# Patient Record
Sex: FEMALE | Race: BLACK OR AFRICAN AMERICAN | NOT HISPANIC OR LATINO | Employment: OTHER | URBAN - METROPOLITAN AREA
[De-identification: names, ages, dates, MRNs, and addresses within clinical notes are randomized per-mention and may not be internally consistent; named-entity substitution may affect disease eponyms.]

---

## 2023-03-10 DIAGNOSIS — F41.9 ANXIETY DISORDER, UNSPECIFIED: ICD-10-CM

## 2023-03-17 RX ORDER — BUSPIRONE HYDROCHLORIDE 15 MG/1
TABLET ORAL
Qty: 30 TABLET | Refills: 3 | Status: SHIPPED | OUTPATIENT
Start: 2023-03-17 | End: 2023-06-16

## 2023-03-29 DIAGNOSIS — I10 ESSENTIAL (PRIMARY) HYPERTENSION: ICD-10-CM

## 2023-03-29 RX ORDER — VERAPAMIL HYDROCHLORIDE 120 MG/1
TABLET, FILM COATED ORAL
Qty: 270 TABLET | Refills: 1 | Status: SHIPPED | OUTPATIENT
Start: 2023-03-29 | End: 2023-06-20 | Stop reason: ALTCHOICE

## 2023-05-09 ENCOUNTER — OFFICE VISIT (OUTPATIENT)
Dept: PRIMARY CARE | Facility: CLINIC | Age: 68
End: 2023-05-09
Payer: MEDICARE

## 2023-05-09 VITALS — SYSTOLIC BLOOD PRESSURE: 130 MMHG | HEART RATE: 70 BPM | DIASTOLIC BLOOD PRESSURE: 94 MMHG

## 2023-05-09 DIAGNOSIS — J01.00 ACUTE NON-RECURRENT MAXILLARY SINUSITIS: Primary | ICD-10-CM

## 2023-05-09 PROCEDURE — 99213 OFFICE O/P EST LOW 20 MIN: CPT | Performed by: INTERNAL MEDICINE

## 2023-05-09 PROCEDURE — 3008F BODY MASS INDEX DOCD: CPT | Performed by: INTERNAL MEDICINE

## 2023-05-09 RX ORDER — BUTALBITAL, ACETAMINOPHEN AND CAFFEINE 50; 325; 40 MG/1; MG/1; MG/1
1 TABLET ORAL EVERY 6 HOURS PRN
COMMUNITY
Start: 2021-07-21 | End: 2023-12-03

## 2023-05-09 RX ORDER — BENZONATATE 200 MG/1
1 CAPSULE ORAL 3 TIMES DAILY PRN
COMMUNITY
Start: 2021-12-30

## 2023-05-09 RX ORDER — ALLOPURINOL 100 MG/1
100 TABLET ORAL DAILY
COMMUNITY
Start: 2020-12-03 | End: 2023-07-19

## 2023-05-09 RX ORDER — ALBUTEROL SULFATE 0.83 MG/ML
2.5 SOLUTION RESPIRATORY (INHALATION) EVERY 6 HOURS PRN
COMMUNITY
Start: 2014-12-08

## 2023-05-09 RX ORDER — FLUTICASONE PROPIONATE 50 MCG
1 SPRAY, SUSPENSION (ML) NASAL 2 TIMES DAILY
COMMUNITY
Start: 2013-09-19

## 2023-05-09 RX ORDER — FUROSEMIDE 40 MG/1
40 TABLET ORAL DAILY
COMMUNITY
End: 2023-10-10 | Stop reason: ALTCHOICE

## 2023-05-09 RX ORDER — ACETAMINOPHEN 500 MG
1 TABLET ORAL DAILY
COMMUNITY
Start: 2020-12-03

## 2023-05-09 RX ORDER — CLONIDINE HYDROCHLORIDE 0.1 MG/1
1 TABLET ORAL 3 TIMES DAILY
COMMUNITY
Start: 2017-05-24 | End: 2023-10-10

## 2023-05-09 RX ORDER — ALBUTEROL SULFATE 90 UG/1
2 AEROSOL, METERED RESPIRATORY (INHALATION) EVERY 6 HOURS PRN
COMMUNITY
Start: 2011-10-05

## 2023-05-09 RX ORDER — BUMETANIDE 0.5 MG/1
0.5 TABLET ORAL DAILY
COMMUNITY
Start: 2023-01-20 | End: 2023-10-10 | Stop reason: SDUPTHER

## 2023-05-09 RX ORDER — AZELASTINE 1 MG/ML
2 SPRAY, METERED NASAL 2 TIMES DAILY
COMMUNITY
Start: 2016-02-04

## 2023-05-09 RX ORDER — DOXYCYCLINE 100 MG/1
100 CAPSULE ORAL 2 TIMES DAILY
Qty: 28 CAPSULE | Refills: 0 | Status: SHIPPED | OUTPATIENT
Start: 2023-05-09 | End: 2023-05-23

## 2023-05-09 RX ORDER — HYDRALAZINE HYDROCHLORIDE 25 MG/1
25 TABLET, FILM COATED ORAL 3 TIMES DAILY
COMMUNITY
Start: 2022-01-22 | End: 2023-10-10 | Stop reason: SDUPTHER

## 2023-05-09 NOTE — PROGRESS NOTES
Subjective   Patient ID: Dilma John is a 68 y.o. female who presents with right ear pain    HPI   The patient reports a history of constant burning pain in the right ear x3 days.  She does report an increase in the intensity of pain when she lies on her left side.  The patient reports a history of a cough, nasal congestion, rhinorrhea, postnasal drip, sneezing since the beginning of the spring.  She reports that the cough initially was productive of clear sputum but over the past 2 weeks has been productive of dark sputum.  She does report yellow-green nasal discharge over the past 2 weeks as well.  Recently, she reports a history of waking up with the eyelids of both eyes being stuck together and hard yellow eye discharge in both eyelids.    She reports no other associated symptoms.  Review of Systems    Objective   There were no vitals taken for this visit.    Physical Exam  Head-palpation revealed tenderness over the right maxillary sinus but no tenderness over the left maxillary sinus or frontal sinuses bilaterally.  Ears-palpation of each pinna and each tragus revealed no tenderness.  External auditory canals are narrow, not erythematous or swollen, TMs clear  Nose-turbinates not erythematous or swollen, no septal deviation noted..  Mouth-posterior pharynx not erythematous, tonsillar pillars appeared normal, no exudates  Neck-no lymphadenopathy.  Lungs-clear.  Cardiac-rate normal, rhythm regular, no murmurs, no JVD.  Abdomen-soft, nondistended. Normal active bowel sounds. Palpation revealed moderate tenderness in the epigastrium but no rebound tenderness or masses.  Liver percussed to 9 cm in total span.  Extremities-no peripheral edema  Assessment/Plan        Assessment  Cough, nasal congestion, rhinorrhea, postnasal drip, sneezing-probably secondary to acute sinusitis in the setting of allergic rhinitis.  Viral syndrome in the setting of allergic rhinitis is a possible etiology as well.  Constant burning  pain in the right ear-may be secondary to eustachian tube dysfunction secondary to acute sinusitis or viral syndrome in the setting of allergic rhinitis.  Morning episodes of the eyelids being stuck together with yellow discharge in both eyelids-may be secondary to allergic conjunctivitis, viral syndrome.  Plan  Begin doxycycline 100 mg p.o. twice daily x10 days.  Continue Astelin spray, Flonase and begin either Zyrtec or Xyzal 1 tablet at bedtime  Continue all other current medications for now  Patient should call me in 5 days with her condition

## 2023-06-13 DIAGNOSIS — F41.9 ANXIETY DISORDER, UNSPECIFIED: ICD-10-CM

## 2023-06-16 RX ORDER — BUSPIRONE HYDROCHLORIDE 15 MG/1
TABLET ORAL
Qty: 90 TABLET | Refills: 1 | Status: SHIPPED | OUTPATIENT
Start: 2023-06-16

## 2023-06-20 DIAGNOSIS — R42 DIZZINESS AND GIDDINESS: ICD-10-CM

## 2023-06-20 RX ORDER — VERAPAMIL HYDROCHLORIDE 120 MG/1
1 TABLET, FILM COATED ORAL 3 TIMES DAILY
COMMUNITY
Start: 2016-06-18 | End: 2023-10-10 | Stop reason: SDUPTHER

## 2023-06-20 RX ORDER — ISOSORBIDE MONONITRATE 60 MG/1
60 TABLET, EXTENDED RELEASE ORAL DAILY
COMMUNITY
Start: 2020-09-28 | End: 2023-10-10 | Stop reason: SDUPTHER

## 2023-06-20 RX ORDER — ASPIRIN 81 MG/1
81 TABLET ORAL DAILY
COMMUNITY
End: 2024-02-09 | Stop reason: WASHOUT

## 2023-06-20 RX ORDER — GABAPENTIN 600 MG/1
2 TABLET ORAL 3 TIMES DAILY
COMMUNITY
Start: 2019-06-04

## 2023-06-20 RX ORDER — DULOXETIN HYDROCHLORIDE 60 MG/1
2 CAPSULE, DELAYED RELEASE ORAL DAILY
COMMUNITY
Start: 2012-07-02 | End: 2023-06-21 | Stop reason: ALTCHOICE

## 2023-06-20 RX ORDER — PANTOPRAZOLE SODIUM 20 MG/1
20 TABLET, DELAYED RELEASE ORAL DAILY
COMMUNITY
End: 2023-11-14

## 2023-06-20 RX ORDER — ONDANSETRON 4 MG/1
8 TABLET, FILM COATED ORAL EVERY 8 HOURS PRN
COMMUNITY
End: 2024-02-09 | Stop reason: SDUPTHER

## 2023-06-20 RX ORDER — HYDROXYZINE PAMOATE 25 MG/1
1 CAPSULE ORAL 3 TIMES DAILY PRN
COMMUNITY
Start: 2013-09-15 | End: 2023-06-21 | Stop reason: SDUPTHER

## 2023-06-20 RX ORDER — TIZANIDINE 4 MG/1
4 TABLET ORAL NIGHTLY
COMMUNITY
End: 2024-02-21 | Stop reason: SDUPTHER

## 2023-06-20 RX ORDER — PREDNISONE 10 MG/1
TABLET ORAL
COMMUNITY
Start: 2023-05-26 | End: 2023-06-21 | Stop reason: SDUPTHER

## 2023-06-20 RX ORDER — FAMOTIDINE 20 MG/1
20 TABLET, FILM COATED ORAL NIGHTLY
COMMUNITY
Start: 2021-11-02 | End: 2023-11-07 | Stop reason: ALTCHOICE

## 2023-06-20 RX ORDER — BUSPIRONE HYDROCHLORIDE 15 MG/1
0.5 TABLET ORAL 2 TIMES DAILY
COMMUNITY
Start: 2019-08-06 | End: 2023-11-07 | Stop reason: SDUPTHER

## 2023-06-20 RX ORDER — MECLIZINE HCL 12.5 MG 12.5 MG/1
TABLET ORAL
Qty: 21 TABLET | Refills: 1 | Status: SHIPPED | OUTPATIENT
Start: 2023-06-20 | End: 2024-02-21 | Stop reason: SDUPTHER

## 2023-06-20 RX ORDER — MONTELUKAST SODIUM 10 MG/1
10 TABLET ORAL NIGHTLY
COMMUNITY
Start: 2022-10-31 | End: 2024-02-09 | Stop reason: WASHOUT

## 2023-06-21 ENCOUNTER — OFFICE VISIT (OUTPATIENT)
Dept: PRIMARY CARE | Facility: CLINIC | Age: 68
End: 2023-06-21
Payer: MEDICARE

## 2023-06-21 VITALS — BODY MASS INDEX: 32.79 KG/M2 | WEIGHT: 191 LBS

## 2023-06-21 DIAGNOSIS — F41.9 ANXIETY: ICD-10-CM

## 2023-06-21 DIAGNOSIS — J01.00 ACUTE NON-RECURRENT MAXILLARY SINUSITIS: Primary | ICD-10-CM

## 2023-06-21 DIAGNOSIS — G43.901 STATUS MIGRAINOSUS: ICD-10-CM

## 2023-06-21 DIAGNOSIS — R42 VERTIGO: Primary | ICD-10-CM

## 2023-06-21 PROCEDURE — 3008F BODY MASS INDEX DOCD: CPT | Performed by: INTERNAL MEDICINE

## 2023-06-21 PROCEDURE — 99213 OFFICE O/P EST LOW 20 MIN: CPT | Performed by: INTERNAL MEDICINE

## 2023-06-21 RX ORDER — VENLAFAXINE 37.5 MG/1
37.5 TABLET ORAL DAILY
Qty: 90 TABLET | Refills: 2 | Status: SHIPPED | OUTPATIENT
Start: 2023-06-21 | End: 2024-02-21 | Stop reason: WASHOUT

## 2023-06-21 RX ORDER — MOXIFLOXACIN HYDROCHLORIDE 400 MG/1
400 TABLET ORAL DAILY
Qty: 10 TABLET | Refills: 0 | Status: SHIPPED | OUTPATIENT
Start: 2023-06-21 | End: 2023-11-07 | Stop reason: ALTCHOICE

## 2023-06-21 RX ORDER — HYDROXYZINE PAMOATE 25 MG/1
25 CAPSULE ORAL 3 TIMES DAILY PRN
Qty: 30 CAPSULE | Refills: 1 | Status: SHIPPED | OUTPATIENT
Start: 2023-06-21 | End: 2023-06-21 | Stop reason: SDUPTHER

## 2023-06-21 RX ORDER — PREDNISONE 10 MG/1
TABLET ORAL
Status: CANCELLED | OUTPATIENT
Start: 2023-06-21

## 2023-06-21 RX ORDER — PREDNISONE 10 MG/1
TABLET ORAL
Qty: 20 EACH | Refills: 1 | Status: SHIPPED | OUTPATIENT
Start: 2023-06-21 | End: 2023-10-17 | Stop reason: SDUPTHER

## 2023-06-21 RX ORDER — MOXIFLOXACIN HYDROCHLORIDE 400 MG/1
400 TABLET ORAL DAILY
COMMUNITY
End: 2023-06-21 | Stop reason: SDUPTHER

## 2023-06-21 RX ORDER — VENLAFAXINE 37.5 MG/1
37.5 TABLET ORAL DAILY
COMMUNITY
Start: 2022-11-08 | End: 2023-06-21 | Stop reason: SDUPTHER

## 2023-06-21 RX ORDER — PREDNISONE 10 MG/1
10 TABLET ORAL DAILY
Qty: 21 EACH | Refills: 0 | Status: CANCELLED | OUTPATIENT
Start: 2023-06-21 | End: 2023-07-12

## 2023-06-21 RX ORDER — HYDROXYZINE PAMOATE 25 MG/1
25 CAPSULE ORAL 3 TIMES DAILY PRN
Qty: 30 CAPSULE | Refills: 1 | Status: SHIPPED | OUTPATIENT
Start: 2023-06-21 | End: 2023-12-21

## 2023-07-19 DIAGNOSIS — Z00.00 ENCOUNTER FOR GENERAL ADULT MEDICAL EXAMINATION WITHOUT ABNORMAL FINDINGS: ICD-10-CM

## 2023-07-19 RX ORDER — ALLOPURINOL 100 MG/1
TABLET ORAL
Qty: 90 TABLET | Refills: 2 | Status: SHIPPED | OUTPATIENT
Start: 2023-07-19

## 2023-10-05 DIAGNOSIS — I10 PRIMARY HYPERTENSION: Primary | ICD-10-CM

## 2023-10-05 RX ORDER — MONTELUKAST SODIUM 10 MG/1
10 TABLET ORAL DAILY
Qty: 90 TABLET | Refills: 1 | OUTPATIENT
Start: 2023-10-05

## 2023-10-05 NOTE — TELEPHONE ENCOUNTER
Patient last seen 5/1/23. Last refill 5/1/23 #90 with 3 refills. Patient switched pharmacy. Please sign and send to new pharmacy. Thanks

## 2023-10-10 PROBLEM — K21.00 GASTROESOPHAGEAL REFLUX DISEASE WITH ESOPHAGITIS: Status: ACTIVE | Noted: 2023-10-10

## 2023-10-10 PROBLEM — T63.441A BEE STING REACTION: Status: ACTIVE | Noted: 2023-10-10

## 2023-10-10 PROBLEM — R06.09 DOE (DYSPNEA ON EXERTION): Status: ACTIVE | Noted: 2023-10-10

## 2023-10-10 PROBLEM — R04.0 EPISTAXIS, RECURRENT: Status: ACTIVE | Noted: 2023-10-10

## 2023-10-10 PROBLEM — Z85.21 HISTORY OF MALIGNANT NEOPLASM OF LARYNX: Status: ACTIVE | Noted: 2022-10-06

## 2023-10-10 PROBLEM — G43.709 CHRONIC MIGRAINE WITHOUT AURA WITHOUT STATUS MIGRAINOSUS, NOT INTRACTABLE: Status: ACTIVE | Noted: 2023-10-10

## 2023-10-10 PROBLEM — R07.9 CHEST PAIN: Status: ACTIVE | Noted: 2023-10-10

## 2023-10-10 PROBLEM — J37.0 CHRONIC LARYNGITIS: Status: ACTIVE | Noted: 2022-10-06

## 2023-10-10 PROBLEM — J30.9 ALLERGIC RHINITIS: Status: ACTIVE | Noted: 2022-10-06

## 2023-10-10 PROBLEM — H52.203 ASTIGMATISM, BILATERAL: Status: ACTIVE | Noted: 2023-10-10

## 2023-10-10 PROBLEM — M62.559 ATROPHY OF QUADRICEPS FEMORIS MUSCLE: Status: ACTIVE | Noted: 2023-10-10

## 2023-10-10 PROBLEM — F41.9 ANXIETY DISORDER: Status: ACTIVE | Noted: 2023-10-10

## 2023-10-10 PROBLEM — J45.909 ASTHMA (HHS-HCC): Status: ACTIVE | Noted: 2023-10-10

## 2023-10-10 PROBLEM — R49.0 CHRONIC HOARSENESS: Status: ACTIVE | Noted: 2022-10-06

## 2023-10-10 PROBLEM — I50.20 HEART FAILURE WITH REDUCED EJECTION FRACTION (MULTI): Status: ACTIVE | Noted: 2023-10-10

## 2023-10-10 PROBLEM — J01.11 ACUTE RECURRENT FRONTAL SINUSITIS: Status: ACTIVE | Noted: 2023-10-10

## 2023-10-10 PROBLEM — I21.4: Status: ACTIVE | Noted: 2023-10-10

## 2023-10-10 PROBLEM — M25.562 BILATERAL KNEE PAIN: Status: ACTIVE | Noted: 2023-10-10

## 2023-10-10 PROBLEM — E78.5 DYSLIPIDEMIA: Status: ACTIVE | Noted: 2023-10-10

## 2023-10-10 PROBLEM — M25.561 BILATERAL KNEE PAIN: Status: ACTIVE | Noted: 2023-10-10

## 2023-10-10 PROBLEM — G47.33 OBSTRUCTIVE SLEEP APNEA: Status: ACTIVE | Noted: 2023-10-10

## 2023-10-10 PROBLEM — N18.30 STAGE 3 CHRONIC KIDNEY DISEASE (MULTI): Status: ACTIVE | Noted: 2023-10-10

## 2023-10-10 PROBLEM — I10 HYPERTENSION: Status: ACTIVE | Noted: 2023-10-10

## 2023-10-10 PROBLEM — H66.91 CHRONIC OTITIS MEDIA OF RIGHT EAR: Status: ACTIVE | Noted: 2023-10-10

## 2023-10-10 RX ORDER — BUMETANIDE 0.5 MG/1
0.5 TABLET ORAL DAILY
Qty: 90 TABLET | Refills: 3 | Status: SHIPPED | OUTPATIENT
Start: 2023-10-10 | End: 2024-02-09 | Stop reason: SDUPTHER

## 2023-10-10 RX ORDER — CLONIDINE HYDROCHLORIDE 0.1 MG/1
0.1 TABLET ORAL 3 TIMES DAILY
Qty: 270 TABLET | Refills: 3 | Status: SHIPPED | OUTPATIENT
Start: 2023-10-10 | End: 2024-02-09 | Stop reason: SDUPTHER

## 2023-10-10 RX ORDER — ISOSORBIDE MONONITRATE 60 MG/1
60 TABLET, EXTENDED RELEASE ORAL DAILY
Qty: 90 TABLET | Refills: 3 | Status: SHIPPED | OUTPATIENT
Start: 2023-10-10 | End: 2024-02-09 | Stop reason: SDUPTHER

## 2023-10-10 RX ORDER — VERAPAMIL HYDROCHLORIDE 120 MG/1
120 TABLET, FILM COATED ORAL 3 TIMES DAILY
Qty: 270 TABLET | Refills: 3 | Status: SHIPPED | OUTPATIENT
Start: 2023-10-10 | End: 2024-02-09 | Stop reason: SDUPTHER

## 2023-10-10 RX ORDER — HYDRALAZINE HYDROCHLORIDE 25 MG/1
25 TABLET, FILM COATED ORAL 3 TIMES DAILY
Qty: 270 TABLET | Refills: 3 | Status: SHIPPED | OUTPATIENT
Start: 2023-10-10 | End: 2024-02-09 | Stop reason: SDUPTHER

## 2023-10-17 DIAGNOSIS — G43.901 STATUS MIGRAINOSUS: ICD-10-CM

## 2023-10-18 RX ORDER — PREDNISONE 10 MG/1
TABLET ORAL
Qty: 15 EACH | Refills: 1 | Status: SHIPPED | OUTPATIENT
Start: 2023-10-18 | End: 2023-11-07 | Stop reason: SDUPTHER

## 2023-11-06 PROBLEM — M60.9 MYOSITIS: Status: ACTIVE | Noted: 2023-11-06

## 2023-11-06 PROBLEM — M62.830 SPASM OF BACK MUSCLES: Status: ACTIVE | Noted: 2023-11-06

## 2023-11-06 PROBLEM — M54.50 LOW BACK PAIN: Status: ACTIVE | Noted: 2023-11-06

## 2023-11-06 PROBLEM — H92.09 EARACHE: Status: ACTIVE | Noted: 2023-11-06

## 2023-11-06 PROBLEM — Z47.1 AFTERCARE FOLLOWING BILATERAL KNEE JOINT REPLACEMENT SURGERY: Status: ACTIVE | Noted: 2023-11-06

## 2023-11-06 PROBLEM — R68.84 JAW PAIN, NON-TMJ: Status: ACTIVE | Noted: 2023-11-06

## 2023-11-06 PROBLEM — R80.9 PROTEINURIA: Status: ACTIVE | Noted: 2023-11-06

## 2023-11-06 PROBLEM — M25.469 EDEMA OF KNEE: Status: ACTIVE | Noted: 2023-11-06

## 2023-11-06 PROBLEM — M19.90 OSTEOARTHRITIS: Status: ACTIVE | Noted: 2023-11-06

## 2023-11-06 PROBLEM — M54.16 LUMBAR RADICULOPATHY, ACUTE: Status: ACTIVE | Noted: 2023-11-06

## 2023-11-06 PROBLEM — M53.3 SACROILIAC JOINT PAIN: Status: ACTIVE | Noted: 2023-11-06

## 2023-11-06 PROBLEM — M25.662 STIFFNESS OF LEFT KNEE, NOT ELSEWHERE CLASSIFIED: Status: ACTIVE | Noted: 2023-11-06

## 2023-11-06 PROBLEM — R11.0 NAUSEA IN ADULT: Status: ACTIVE | Noted: 2023-11-06

## 2023-11-06 PROBLEM — R63.4 WEIGHT LOSS, UNINTENTIONAL: Status: ACTIVE | Noted: 2023-11-06

## 2023-11-06 PROBLEM — M48.061 FORAMINAL STENOSIS OF LUMBAR REGION: Status: ACTIVE | Noted: 2023-11-06

## 2023-11-06 PROBLEM — Z88.9 HISTORY OF ALLERGY: Status: ACTIVE | Noted: 2023-11-06

## 2023-11-06 PROBLEM — E66.9 CLASS 1 OBESITY WITH BODY MASS INDEX (BMI) OF 30.0 TO 30.9 IN ADULT: Status: ACTIVE | Noted: 2023-11-06

## 2023-11-06 PROBLEM — M26.629 TEMPOROMANDIBULAR JOINT-PAIN-DYSFUNCTION SYNDROME: Status: ACTIVE | Noted: 2022-10-06

## 2023-11-06 PROBLEM — R26.2 DIFFICULTY WALKING DUE TO KNEE JOINT: Status: ACTIVE | Noted: 2023-11-06

## 2023-11-06 PROBLEM — M79.606 LEG PAIN, LATERAL: Status: ACTIVE | Noted: 2023-11-06

## 2023-11-06 PROBLEM — R31.9 HEMATURIA: Status: ACTIVE | Noted: 2023-11-06

## 2023-11-06 PROBLEM — H52.13 BILATERAL MYOPIA: Status: ACTIVE | Noted: 2023-11-06

## 2023-11-06 PROBLEM — M62.81 QUADRICEPS WEAKNESS: Status: ACTIVE | Noted: 2023-11-06

## 2023-11-06 PROBLEM — Z96.653 AFTERCARE FOLLOWING BILATERAL KNEE JOINT REPLACEMENT SURGERY: Status: ACTIVE | Noted: 2023-11-06

## 2023-11-06 PROBLEM — E66.9 CLASS 1 OBESITY WITH BODY MASS INDEX (BMI) OF 34.0 TO 34.9 IN ADULT: Status: ACTIVE | Noted: 2023-11-06

## 2023-11-06 PROBLEM — M54.16 LUMBAR NEURITIS: Status: ACTIVE | Noted: 2023-11-06

## 2023-11-06 PROBLEM — L72.3 SEBACEOUS CYST: Status: ACTIVE | Noted: 2023-11-06

## 2023-11-06 PROBLEM — R10.9 ABDOMINAL PAIN: Status: ACTIVE | Noted: 2023-11-06

## 2023-11-06 PROBLEM — R82.81 PYURIA: Status: ACTIVE | Noted: 2023-11-06

## 2023-11-06 PROBLEM — M54.81 BILATERAL OCCIPITAL NEURALGIA: Status: ACTIVE | Noted: 2023-11-06

## 2023-11-06 PROBLEM — R13.10 ODYNOPHAGIA: Status: ACTIVE | Noted: 2023-11-06

## 2023-11-06 PROBLEM — H92.01 REFERRED OTALGIA OF RIGHT EAR: Status: ACTIVE | Noted: 2022-10-06

## 2023-11-06 PROBLEM — M53.0 CERVICOCRANIAL SYNDROME: Status: ACTIVE | Noted: 2023-11-06

## 2023-11-06 PROBLEM — M25.462 EFFUSION OF LEFT KNEE: Status: ACTIVE | Noted: 2023-11-06

## 2023-11-06 PROBLEM — R79.89 ELEVATED SERUM CREATININE: Status: ACTIVE | Noted: 2023-11-06

## 2023-11-06 PROBLEM — R19.7 DIARRHEA: Status: ACTIVE | Noted: 2023-11-06

## 2023-11-06 PROBLEM — K11.7 SALIVARY SECRETION DISTURBANCE: Status: ACTIVE | Noted: 2023-11-06

## 2023-11-06 PROBLEM — K29.70 GASTRITIS: Status: ACTIVE | Noted: 2023-11-06

## 2023-11-06 PROBLEM — B37.2 CANDIDAL INTERTRIGO: Status: ACTIVE | Noted: 2023-11-06

## 2023-11-06 PROBLEM — H60.90 OTITIS EXTERNA: Status: ACTIVE | Noted: 2023-11-06

## 2023-11-06 PROBLEM — R09.82 POST-NASAL DRIP: Status: ACTIVE | Noted: 2023-11-06

## 2023-11-06 PROBLEM — M79.644 PAIN OF RIGHT THUMB: Status: ACTIVE | Noted: 2023-11-06

## 2023-11-06 RX ORDER — ERYTHROMYCIN 500 MG/1
1000 TABLET, COATED ORAL
COMMUNITY

## 2023-11-06 RX ORDER — MINERAL OIL
1 ENEMA (ML) RECTAL DAILY
COMMUNITY
Start: 2022-10-06 | End: 2023-11-07 | Stop reason: ALTCHOICE

## 2023-11-06 RX ORDER — DEXAMETHASONE 2 MG/1
TABLET ORAL
COMMUNITY
End: 2023-11-07 | Stop reason: ALTCHOICE

## 2023-11-06 RX ORDER — OXYCODONE AND ACETAMINOPHEN 5; 325 MG/1; MG/1
1-2 TABLET ORAL EVERY 6 HOURS PRN
COMMUNITY
Start: 2015-09-03 | End: 2023-11-07 | Stop reason: ALTCHOICE

## 2023-11-06 RX ORDER — NEOMYCIN SULFATE, POLYMYXIN B SULFATE, HYDROCORTISONE 3.5; 10000; 1 MG/ML; [USP'U]/ML; MG/ML
3 SOLUTION/ DROPS AURICULAR (OTIC)
COMMUNITY
Start: 2022-08-09

## 2023-11-06 RX ORDER — CYCLOBENZAPRINE HCL 10 MG
10 TABLET ORAL 3 TIMES DAILY PRN
COMMUNITY
End: 2024-02-09 | Stop reason: ALTCHOICE

## 2023-11-06 RX ORDER — ACETAMINOPHEN AND CODEINE PHOSPHATE 300; 30 MG/1; MG/1
1 TABLET ORAL AS NEEDED
COMMUNITY
End: 2024-02-09 | Stop reason: ALTCHOICE

## 2023-11-06 RX ORDER — PREDNISONE 20 MG/1
60 TABLET ORAL
COMMUNITY
Start: 2022-11-08 | End: 2023-11-07 | Stop reason: ALTCHOICE

## 2023-11-06 RX ORDER — HYDROCODONE BITARTRATE AND ACETAMINOPHEN 10; 300 MG/1; MG/1
TABLET ORAL
COMMUNITY
End: 2023-11-07 | Stop reason: ALTCHOICE

## 2023-11-06 RX ORDER — DOXYCYCLINE 100 MG/1
1 CAPSULE ORAL EVERY 12 HOURS
COMMUNITY
End: 2023-11-07 | Stop reason: ALTCHOICE

## 2023-11-06 RX ORDER — METOCLOPRAMIDE 10 MG/1
10 TABLET ORAL 2 TIMES DAILY
COMMUNITY
Start: 2021-09-07

## 2023-11-06 RX ORDER — DOCUSATE SODIUM 100 MG/1
100 CAPSULE, LIQUID FILLED ORAL 2 TIMES DAILY PRN
COMMUNITY
Start: 2023-02-17 | End: 2023-11-07 | Stop reason: ALTCHOICE

## 2023-11-06 RX ORDER — HYDROCODONE BITARTRATE AND ACETAMINOPHEN 5; 325 MG/1; MG/1
1-2 TABLET ORAL AS NEEDED
COMMUNITY
Start: 2023-02-03 | End: 2023-11-07 | Stop reason: ALTCHOICE

## 2023-11-06 RX ORDER — DULOXETIN HYDROCHLORIDE 60 MG/1
2 CAPSULE, DELAYED RELEASE ORAL NIGHTLY
COMMUNITY
End: 2024-02-21 | Stop reason: SDUPTHER

## 2023-11-06 RX ORDER — TRAMADOL HYDROCHLORIDE 50 MG/1
50 TABLET ORAL EVERY 6 HOURS PRN
COMMUNITY
End: 2023-11-07 | Stop reason: ALTCHOICE

## 2023-11-06 RX ORDER — NITROGLYCERIN 0.3 MG/1
0.3 TABLET SUBLINGUAL EVERY 5 MIN PRN
COMMUNITY
Start: 2020-08-27

## 2023-11-06 RX ORDER — OMEPRAZOLE 20 MG/1
20 TABLET, DELAYED RELEASE ORAL DAILY
COMMUNITY
Start: 2023-02-17 | End: 2024-02-01 | Stop reason: WASHOUT

## 2023-11-06 RX ORDER — CHLORHEXIDINE GLUCONATE ORAL RINSE 1.2 MG/ML
15 SOLUTION DENTAL DAILY
COMMUNITY
Start: 2023-01-17 | End: 2023-11-07 | Stop reason: ALTCHOICE

## 2023-11-06 RX ORDER — FLUTICASONE PROPIONATE 110 UG/1
2 AEROSOL, METERED RESPIRATORY (INHALATION)
COMMUNITY
End: 2024-02-21

## 2023-11-06 RX ORDER — ONDANSETRON 8 MG/1
1 TABLET, ORALLY DISINTEGRATING ORAL EVERY 6 HOURS PRN
COMMUNITY
Start: 2015-12-09 | End: 2023-11-07 | Stop reason: SDUPTHER

## 2023-11-06 RX ORDER — MULTIVITAMIN WITH IRON
1 TABLET ORAL DAILY
COMMUNITY
Start: 2014-08-13

## 2023-11-06 RX ORDER — ISOSORBIDE MONONITRATE 30 MG/1
30 TABLET, EXTENDED RELEASE ORAL 2 TIMES DAILY
COMMUNITY
End: 2023-11-07 | Stop reason: WASHOUT

## 2023-11-06 RX ORDER — DICLOFENAC SODIUM 16.05 MG/ML
4 SOLUTION TOPICAL 4 TIMES DAILY PRN
COMMUNITY

## 2023-11-06 RX ORDER — ONDANSETRON 4 MG/1
1 TABLET, FILM COATED ORAL EVERY 8 HOURS PRN
COMMUNITY
Start: 2023-02-17

## 2023-11-07 ENCOUNTER — LAB (OUTPATIENT)
Dept: LAB | Facility: LAB | Age: 68
End: 2023-11-07
Payer: MEDICARE

## 2023-11-07 ENCOUNTER — TELEPHONE (OUTPATIENT)
Dept: NEUROLOGY | Facility: CLINIC | Age: 68
End: 2023-11-07

## 2023-11-07 ENCOUNTER — OFFICE VISIT (OUTPATIENT)
Dept: CARDIOLOGY | Facility: CLINIC | Age: 68
End: 2023-11-07
Payer: MEDICARE

## 2023-11-07 VITALS
BODY MASS INDEX: 34.31 KG/M2 | DIASTOLIC BLOOD PRESSURE: 82 MMHG | HEIGHT: 64 IN | SYSTOLIC BLOOD PRESSURE: 115 MMHG | WEIGHT: 201 LBS | OXYGEN SATURATION: 96 % | HEART RATE: 67 BPM

## 2023-11-07 DIAGNOSIS — G43.901 STATUS MIGRAINOSUS: Primary | ICD-10-CM

## 2023-11-07 DIAGNOSIS — I50.20 HEART FAILURE WITH REDUCED EJECTION FRACTION (MULTI): ICD-10-CM

## 2023-11-07 DIAGNOSIS — U07.1 COVID-19: Primary | ICD-10-CM

## 2023-11-07 DIAGNOSIS — E78.5 DYSLIPIDEMIA: ICD-10-CM

## 2023-11-07 DIAGNOSIS — R06.09 DOE (DYSPNEA ON EXERTION): ICD-10-CM

## 2023-11-07 DIAGNOSIS — U07.1 COVID-19: ICD-10-CM

## 2023-11-07 DIAGNOSIS — I15.0 RENOVASCULAR HYPERTENSION: ICD-10-CM

## 2023-11-07 DIAGNOSIS — I21.4 NON-ST ELEVATION MYOCARDIAL INFARCTION (NSTEMI), INITIAL CARE EPISODE (MULTI): ICD-10-CM

## 2023-11-07 LAB
BASOPHILS # BLD AUTO: 0.05 X10*3/UL (ref 0–0.1)
BASOPHILS NFR BLD AUTO: 1.3 %
EOSINOPHIL # BLD AUTO: 0.15 X10*3/UL (ref 0–0.7)
EOSINOPHIL NFR BLD AUTO: 3.9 %
ERYTHROCYTE [DISTWIDTH] IN BLOOD BY AUTOMATED COUNT: 15 % (ref 11.5–14.5)
ERYTHROCYTE [SEDIMENTATION RATE] IN BLOOD BY WESTERGREN METHOD: 14 MM/H (ref 0–30)
HCT VFR BLD AUTO: 38.6 % (ref 36–46)
HGB BLD-MCNC: 11.4 G/DL (ref 12–16)
IMM GRANULOCYTES # BLD AUTO: 0.01 X10*3/UL (ref 0–0.7)
IMM GRANULOCYTES NFR BLD AUTO: 0.3 % (ref 0–0.9)
LYMPHOCYTES # BLD AUTO: 1.22 X10*3/UL (ref 1.2–4.8)
LYMPHOCYTES NFR BLD AUTO: 31.4 %
MCH RBC QN AUTO: 24.9 PG (ref 26–34)
MCHC RBC AUTO-ENTMCNC: 29.5 G/DL (ref 32–36)
MCV RBC AUTO: 84 FL (ref 80–100)
MONOCYTES # BLD AUTO: 0.4 X10*3/UL (ref 0.1–1)
MONOCYTES NFR BLD AUTO: 10.3 %
NEUTROPHILS # BLD AUTO: 2.05 X10*3/UL (ref 1.2–7.7)
NEUTROPHILS NFR BLD AUTO: 52.8 %
NRBC BLD-RTO: 0 /100 WBCS (ref 0–0)
PLATELET # BLD AUTO: 199 X10*3/UL (ref 150–450)
RBC # BLD AUTO: 4.58 X10*6/UL (ref 4–5.2)
WBC # BLD AUTO: 3.9 X10*3/UL (ref 4.4–11.3)

## 2023-11-07 PROCEDURE — 80069 RENAL FUNCTION PANEL: CPT

## 2023-11-07 PROCEDURE — 93005 ELECTROCARDIOGRAM TRACING: CPT | Performed by: INTERNAL MEDICINE

## 2023-11-07 PROCEDURE — 3008F BODY MASS INDEX DOCD: CPT | Performed by: INTERNAL MEDICINE

## 2023-11-07 PROCEDURE — 85652 RBC SED RATE AUTOMATED: CPT

## 2023-11-07 PROCEDURE — 3074F SYST BP LT 130 MM HG: CPT | Performed by: INTERNAL MEDICINE

## 2023-11-07 PROCEDURE — 99214 OFFICE O/P EST MOD 30 MIN: CPT | Mod: 25 | Performed by: INTERNAL MEDICINE

## 2023-11-07 PROCEDURE — 80061 LIPID PANEL: CPT

## 2023-11-07 PROCEDURE — 1126F AMNT PAIN NOTED NONE PRSNT: CPT | Performed by: INTERNAL MEDICINE

## 2023-11-07 PROCEDURE — 1160F RVW MEDS BY RX/DR IN RCRD: CPT | Performed by: INTERNAL MEDICINE

## 2023-11-07 PROCEDURE — 86140 C-REACTIVE PROTEIN: CPT

## 2023-11-07 PROCEDURE — 99214 OFFICE O/P EST MOD 30 MIN: CPT | Performed by: INTERNAL MEDICINE

## 2023-11-07 PROCEDURE — 3079F DIAST BP 80-89 MM HG: CPT | Performed by: INTERNAL MEDICINE

## 2023-11-07 PROCEDURE — 93010 ELECTROCARDIOGRAM REPORT: CPT | Performed by: INTERNAL MEDICINE

## 2023-11-07 PROCEDURE — 1036F TOBACCO NON-USER: CPT | Performed by: INTERNAL MEDICINE

## 2023-11-07 PROCEDURE — 84484 ASSAY OF TROPONIN QUANT: CPT

## 2023-11-07 PROCEDURE — 1159F MED LIST DOCD IN RCRD: CPT | Performed by: INTERNAL MEDICINE

## 2023-11-07 PROCEDURE — 85025 COMPLETE CBC W/AUTO DIFF WBC: CPT

## 2023-11-07 PROCEDURE — 36415 COLL VENOUS BLD VENIPUNCTURE: CPT

## 2023-11-07 PROCEDURE — 83735 ASSAY OF MAGNESIUM: CPT

## 2023-11-07 PROCEDURE — 83880 ASSAY OF NATRIURETIC PEPTIDE: CPT

## 2023-11-07 RX ORDER — PREDNISONE 10 MG/1
TABLET ORAL
Qty: 15 EACH | Refills: 1 | Status: SHIPPED | OUTPATIENT
Start: 2023-11-07 | End: 2024-02-01 | Stop reason: ALTCHOICE

## 2023-11-07 ASSESSMENT — PAIN SCALES - GENERAL: PAINLEVEL: 0-NO PAIN

## 2023-11-07 ASSESSMENT — ENCOUNTER SYMPTOMS
DEPRESSION: 0
OCCASIONAL FEELINGS OF UNSTEADINESS: 1
LOSS OF SENSATION IN FEET: 0

## 2023-11-07 NOTE — PATIENT INSTRUCTIONS
"It was a pleasure seeing you today. I would like to see you back in clinic in  3    months. You can call my office if questions arise between now and our next visit.     Today, we talked about many different issues   -- Please let your doctors know if you are having issues.     -- Please consider going to the ER if you continue to have stomach issues and pain     -- Please let us know as many of your medications can causing worsening renal function or other issues if you take too much when you are feeling unwell.     -- Please have blood work done.    Please get vaccinated. This may not prevent all infections, but it does help diminish the intensity, duration and complications related to infections from viruses such as Influenza and COVID.     Below are some Heart Health Tips that we provide to all of our patients. I hope you fing them useful.     - If you are having problems with medications, consider looking at the following websites.   --  \"GoodRx\"   --  \"[facility]\"      - We are happy to supply written prescriptions if needed to allow you to obtain your medications from different pharmacies. Additionally, if you are having issues with mail order delivery, please let us know. We can send a limited supply of your medications to your local pharmacy.     -  We recommend you follow a heart healthy diet. Watch food labels and try not to eat more than 2,500 mg of sodium per day. Avoid foods high in salt like processed meats (lunch meats, birch, and sausage), processed foods (boxed dinners, canned soups), fried and fast foods. Monitor serving sizes and if the sodium per serving size is more than 200 mg, avoid those foods. If the sodium per serving size is between 100-200 mg, you can use those in limited quantities. Try to choose foods where the amount of sodium per serving size is less than 100 mg. Try to eat a diet rich in fruits and vegetables, whole grains, low fat dairy products, skinless " poultry and fish, nuts, beans, non-tropical vegetable oils. Limit saturated fat, trans fat, sodium, red meats, and sugar-sweetened beverages.   Limit alcohol     -The combination of a reduced-calorie diet and increased physical activity is recommended. Adults should aim to get at least 150 minutes of moderate physical activity per week (30 minutes of moderate physical activities at least 5 days per week). Examples of moderate physical activities include brisk walking, swimming, aerobic dancing, heavy gardening, jumping rope, bicycling 10 MPH or faster, tennis, hiking uphill or with a heavy backpack. Please let us know if you would like to learn more about your nutrition and calories and additional options including weight loss programs to help you reach your goal.     -If you smoke, stop smoking. If you stop smoking you can help get rid of a major source of stress to your heart. Smoking makes your heart rate and blood pressure go up and increases your risk or developing cardiovascular diseases and worsen symptoms associated with heart failure.     -Obtain a BP monitor and monitor your BP daily. Check it around the same time each day; at least 1 hour after taking your medications. Record your BP in a log and bring your log with you to your doctors appointment.     -F/u with your PCP as recommended.

## 2023-11-07 NOTE — PROGRESS NOTES
Chief Complaint:   Follow-up     History Of Present Illness:    Dilma John is a 68 y.o. female presenting with a pertinent medical history notable for obesity, obstructive sleep apnea since resolved with weight loss, chronic dyspnea on exertion, essential hypertension, chronic migraineheadaches, persistent asthma, anxiety who is seen in cardiology clinic for continued care.      Recall that she as being followed for Delayed Presentation NSTEMI. She was managed medically and had ischemic evaluation prior to discharge. Her course was complicated by Acute on Chronic Kidney Injury.. She has met with Nephrology who suggest ~ 10-15% risk of MAHAD and her kidney function is improving. She went forward with cardiac catheterization which did not reveal significant stenosis. It was felt that he event my have been stress related.      Since she was last seen, she is doing well. She unfortunately was diagnosed with Covid a around the new year of . States that she had cough, congestion, shortness of breath. She continues to deal with this. She was managed in the outpatient setting and did not require hospitalization. Reports continued issues of fatigue, malaise and most importantly osteoarthritic pain related to her knees. States that this has become burdensome. She had her right knee replaced in March. She continues to wait on the left knee. She is currently planned on traveling to [place] for vacation. She reports that prior to this appointment, she has not had any chest pain, pressure, shortness of breath      ( 2023) She returns for periopertive risk stratification. She has just returned from [place] after a  for her grandmother. She has been experiencing headaches and migraines which she attributes to missing her chronic migraine e medication.      Today ( 2023) she returns for follow up. She has completed her knee replacement and has progressed well with recovery. Since she was last seen, she has  "lost weight and feels dramatically better She is looking forward to riding her brand new bike with her new knees.     11/7/2023 -- She returns for follow up. She has had recurrent bouts of COVID ( 3rd Episode ) and reports that she had completed original panel. States that she was diagnosed with Home COVID-19 Screening  and notes that she tested positive in beginning of October. She did not reach out to her PCP.  Notes that she needed to use her Inhaler during this period but that she \"slept for 2 weeks\"  She has not started to exercise despite her knee replacements. She notes that she has been having several years of ongoing epistaxis and had undergone epistaxis X several issues,  She notes increased  abdominal pain and discomfort. She has been using lots of medications to address her various ills, but hasn't reached out to any of her providers.    .     Last Recorded Vitals:  Vitals:    11/07/23 1126   BP: 115/82   Patient Position: Sitting   Pulse: 67   SpO2: 96%   Weight: 91.2 kg (201 lb)   Height: 1.626 m (5' 4\")       Past Medical History:  She has a past medical history of Chronic sinusitis, unspecified (04/26/2021), Essential (primary) hypertension (07/05/2022), and Personal history of other diseases of the nervous system and sense organs (12/30/2021).    Past Surgical History:  She has a past surgical history that includes Other surgical history (04/17/2019); Other surgical history (04/17/2019); Other surgical history (04/17/2019); and MR angio head wo IV contrast (6/13/2013).      Social History:  She reports that she has never smoked. She has never used smokeless tobacco. No history on file for alcohol use and drug use.    Family History:  Family History   Problem Relation Name Age of Onset    Arthritis Mother      Asthma Other      Diabetes Other      Hypertension Other      Heart attack Other      Stroke Other          Allergies:  Buspirone, Cefazolin, Clindamycin, Donepezil, Erythromycin, Ibuprofen, " Latex, Levofloxacin, Nsaids (non-steroidal anti-inflammatory drug), Sulfa (sulfonamide antibiotics), Benadryl allergy decongestant, and Penicillins    Outpatient Medications:  Current Outpatient Medications   Medication Instructions    acetaminophen-codeine (Tylenol w/ Codeine #3) 300-30 mg tablet 1 tablet, oral, As needed    albuterol 90 mcg/actuation inhaler 2 puffs, inhalation, Every 6 hours PRN    albuterol 2.5 mg, inhalation, Every 6 hours PRN    allopurinol (Zyloprim) 100 mg tablet TAKE 1 TABLET BY MOUTH EVERY DAY    aspirin 81 mg, oral, Daily    azelastine (Astelin) 137 mcg (0.1 %) nasal spray 2 sprays, nasal, 2 times daily    benzonatate (Tessalon) 200 mg capsule 1 capsule, oral, 3 times daily PRN    bumetanide (BUMEX) 0.5 mg, oral, Daily    busPIRone (Buspar) 15 mg tablet TAKE 1/2 TABLET BY MOUTH TWICE A DAY    butalbital-acetaminophen-caff -40 mg tablet 1 tablet, oral, Every 6 hours PRN    cholecalciferol (Vitamin D-3) 50 mcg (2,000 unit) capsule 1 capsule, oral, Daily    cloNIDine (CATAPRES) 0.1 mg, oral, 3 times daily    cyclobenzaprine (FLEXERIL) 10 mg, oral, 3 times daily PRN    diclofenac sodium 1.5 % drops 4 drops, Topical, 4 times daily PRN    DULoxetine (Cymbalta) 60 mg DR capsule 2 capsules, oral, Nightly    erythromycin base (E-MYCIN) 1,000 mg, oral, ONE HOUR BEFORE THE DENTAL PROCEDURE<BR>    fluticasone (Flonase) 50 mcg/actuation nasal spray 1 spray, nasal, 2 times daily    fluticasone (Flovent HFA) 110 mcg/actuation inhaler 2 puffs, inhalation, 2 times daily RT    gabapentin (Neurontin) 600 mg tablet 2 tablets, oral, 3 times daily    hydrALAZINE (APRESOLINE) 25 mg, oral, 3 times daily    hydrOXYzine pamoate (VISTARIL) 25 mg, oral, 3 times daily PRN    isosorbide mononitrate ER (IMDUR) 60 mg, oral, Daily    meclizine (Antivert) 12.5 mg tablet TAKE 1 TABLET BY MOUTH THREE TIMES A DAY AS NEEDED    metoclopramide (REGLAN) 10 mg, oral, 2 times daily    montelukast (SINGULAIR) 10 mg, oral,  "Nightly    multivitamin (Multiple Vitamins) tablet 1 tablet, oral, Daily    neomycin-polymyxin-HC (Cortisporin) otic solution 3 drops, Each Ear, 3-4 TIMES DAILY.    nitroglycerin (NITROSTAT) 0.3 mg, sublingual, Every 5 min PRN    omeprazole OTC (PRILOSEC OTC) 20 mg, oral, Daily    ondansetron (Zofran) 4 mg tablet 1 tablet, oral, Every 8 hours PRN    ondansetron (ZOFRAN) 8 mg, oral, Every 8 hours PRN    pantoprazole (PROTONIX) 20 mg, oral, Daily    tiZANidine (ZANAFLEX) 4 mg, oral, Nightly    venlafaxine (EFFEXOR) 37.5 mg, oral, Daily    verapamil (CALAN) 120 mg, oral, 3 times daily       Physical Exam:  Physical Exam   /82 (Patient Position: Sitting)   Pulse 67   Ht 1.626 m (5' 4\")   Wt 91.2 kg (201 lb)   SpO2 96%   BMI 34.50 kg/m²   /82 (Patient Position: Sitting)   Pulse 67   Ht 1.626 m (5' 4\")   Wt 91.2 kg (201 lb)   SpO2 96%   BMI 34.50 kg/m²     General Appearance:  Alert, cooperative, no distress, appears stated age   Head:  Normocephalic, without obvious abnormality, atraumatic   Nose: Nares normal, septum midline,mucosa normal, no drainage or sinus tenderness   Throat: Lips, mucosa, and tongue normal; teeth and gums normal   Back:   Symmetric, no curvature, ROM normal, no CVA tenderness   Lungs:   Clear to auscultation bilaterally, respirations unlabored   Heart:  Regular rate and rhythm, S1 and S2 normal, no murmur, rub, or gallop   Abdomen:   Soft, non-tender, bowel sounds active all four quadrants,  no masses, no organomegaly   Extremities: Extremities normal, atraumatic, no cyanosis or edema   Pulses: 2+ and symmetric   Skin: Skin color, texture, turgor normal, no rashes or lesions   Lymph nodes: Cervical, supraclavicular, and axillary nodes normal   Neurologic: Normal          Last Labs:  CBC -  Lab Results   Component Value Date    WBC 8.5 02/18/2023    HGB 12.1 02/18/2023    HCT 38.9 02/18/2023    MCV 84 02/18/2023     02/18/2023       CMP -  Lab Results   Component Value " Date    CALCIUM 8.7 02/18/2023    PHOS 4.3 01/26/2023    PROT 7.1 02/18/2023    ALBUMIN 4.1 02/18/2023    AST 18 02/18/2023    ALT 11 02/18/2023    ALKPHOS 70 02/18/2023    BILITOT 0.7 02/18/2023       LIPID PANEL -   Lab Results   Component Value Date    CHOL 231 (H) 10/14/2019    TRIG 81 10/14/2019    .7 10/14/2019    CHHDL 2.1 10/14/2019    LDLF 104 (H) 10/14/2019    VLDL 16 10/14/2019       RENAL FUNCTION PANEL -   Lab Results   Component Value Date    GLUCOSE 125 (H) 02/18/2023     02/18/2023    K 4.1 02/18/2023     02/18/2023    CO2 26 02/18/2023    ANIONGAP 14 02/18/2023    BUN 19 02/18/2023    CREATININE 1.56 (H) 02/18/2023    CALCIUM 8.7 02/18/2023    PHOS 4.3 01/26/2023    ALBUMIN 4.1 02/18/2023        Lab Results   Component Value Date     (H) 01/26/2023       Last Cardiology Tests:  ECG:  In Office EKG 11/7/2023 -- Sinus Rhythm @ 67 BPM       Echo:  Echocardiogram 1/2023   1. Left ventricular systolic function is normal with a 65-70% estimated ejection fraction.   2. Spectral Doppler shows an impaired relaxation pattern of left ventricular diastolic filling.   3. Severely increased left ventricular septal thickness.   4. The left ventricular posterior wall thickness is severely increased.   5. Mild aortic valve stenosis.   6. Left ventricular cavity size is decreased.    Cath:  Coronary Angiography:  The coronary circulation is right dominant.     Coronary Angiography Comments:  There is only branch vessel coronary artery disease in this right dominant system. The left main is dilated but normal. The wraparound LAD and diagonal branches are dilated and tortuous but otherwise unremarkable. The LCx and OM branches are also tortuous but otherwise free of disease. The large dominant RCA is dilated and tortuous but otherwise unremarkable. The sizable RPDA and first posterolateral branch are free of significant disease. A small second posterolateral branch appears to be diffusely  diseased and narrowed up to 90% in its proximal to mid segment. This has the appearance of a possible spontaneous coronary artery dissection (SCAD).        Left Main Coronary Artery:  The left main coronary artery is a normal caliber vessel. The left main arises normally from the left coronary sinus of Valsalva and bifurcates into the LAD and circumflex coronary arteries. The left main coronary artery showed no significant disease or stenosis greater than 30%.     Left Anterior Descending Coronary Artery Distribution:  The left anterior descending coronary artery is a normal caliber vessel. The LAD arises normally from the left main coronary artery. The LAD demonstrated no significant disease or stenosis greater than 30%.     Circumflex Coronary Artery Distribution:  The circumflex coronary artery is a normal caliber vessel. The circumflex arises normally from the left main coronary artery and terminates in the AV groove. The circumflex revealed no significant disease or stenosis greater than 30%.     Right Coronary Artery Distribution:     The right coronary artery is a normal caliber vessel. The RCA arises normally from the right sinus of Valsalva. The RCA showed no significant disease or stenosis greater than 30%.  The proximal to mid right posterolateral branch showed 90% stenosis.        Valve Findings:  No aortic valve stenosis is visualized.     Coronary Lesion Summary:  Vessel   Stenosis   Vessel Segment  RPL    90% stenosis proximal to mid     Stress Test:  Stress Test -- Pharmacological   FINDINGS:  Rest images demonstrate a normal distribution of perfusion throughout all LV segments. Stress images demonstrate a normal distribution of perfusion throughout all LV segments except for moderate reduction in  perfusion of the mid and distal anteroseptal wall concerning for ischemia/reversibility most likely in the LAD territory.     TID: 1.14     ECG-gated images demonstrate normal LV size (EDV 100ml) and mild  to moderately reduced myocardial contractility with an LV ejection fraction of 38 % (normal above 45 percent).   There is hypokinesis of the mid and distal anterior septal wall.     IMPRESSION:  1. Abnormal stress myocardial perfusion imaging in response to pharmacologic stress moderate reduction in perfusion of the mid and distal anteroseptal wall concerning for ischemia. Results were discussed with Dr. Marino of Cardiology.  2. Mild to moderately reduced EF of 38% with hypokinesis of the mid and distal anterior septal wall.  Cardiac Imaging:  No results found for this or any previous visit from the past 1095 days.        Lab review: I have personally reviewed the laboratory result(s)   Diagnostic review: I have personally reviewed the result(s) of the EKG and Echocardiogram .   Imaging review: I have  personally reviewed the result(s)     Assessment/Plan     /  Problem List Items Addressed This Visit       BRYAN (dyspnea on exertion)    Relevant Orders    ECG 12 lead (Clinic Performed) (Completed)    B-Type Natriuretic Peptide (Completed)    Troponin I, High Sensitivity (Completed)    Dyslipidemia    Relevant Orders    Lipid Panel (Completed)    Heart failure with reduced ejection fraction (CMS/HCC)    Relevant Orders    ECG 12 lead (Clinic Performed) (Completed)    B-Type Natriuretic Peptide (Completed)    Troponin I, High Sensitivity (Completed)    Renal function panel (Completed)    Magnesium (Completed)    Hypertension    Relevant Orders    Renal function panel (Completed)    Magnesium (Completed)    Non-ST elevation myocardial infarction (NSTEMI), initial care episode (CMS/HCC)    Relevant Orders    ECG 12 lead (Clinic Performed) (Completed)    Lipid Panel (Completed)    Troponin I, High Sensitivity (Completed)    Renal function panel (Completed)    Magnesium (Completed)     Other Visit Diagnoses       COVID-19    -  Primary    Relevant Orders    ECG 12 lead (Clinic Performed) (Completed)    B-Type Natriuretic  Peptide (Completed)    Troponin I, High Sensitivity (Completed)    Sedimentation rate, automated (Completed)    C-Reactive Protein (Completed)    CBC and Auto Differential (Completed)                  Noman Marino, DO

## 2023-11-08 LAB
ALBUMIN SERPL BCP-MCNC: 4.2 G/DL (ref 3.4–5)
ANION GAP SERPL CALC-SCNC: 13 MMOL/L (ref 10–20)
BNP SERPL-MCNC: 118 PG/ML (ref 0–99)
BUN SERPL-MCNC: 32 MG/DL (ref 6–23)
CALCIUM SERPL-MCNC: 9.1 MG/DL (ref 8.6–10.6)
CARDIAC TROPONIN I PNL SERPL HS: 8 NG/L (ref 0–34)
CHLORIDE SERPL-SCNC: 109 MMOL/L (ref 98–107)
CHOLEST SERPL-MCNC: 194 MG/DL (ref 0–199)
CHOLESTEROL/HDL RATIO: 2
CO2 SERPL-SCNC: 26 MMOL/L (ref 21–32)
CREAT SERPL-MCNC: 1.97 MG/DL (ref 0.5–1.05)
CRP SERPL-MCNC: <0.1 MG/DL
GFR SERPL CREATININE-BSD FRML MDRD: 28 ML/MIN/1.73M*2
GLUCOSE SERPL-MCNC: 81 MG/DL (ref 74–99)
HDLC SERPL-MCNC: 95.5 MG/DL
LDLC SERPL CALC-MCNC: 85 MG/DL
MAGNESIUM SERPL-MCNC: 2.47 MG/DL (ref 1.6–2.4)
NON HDL CHOLESTEROL: 99 MG/DL (ref 0–149)
PHOSPHATE SERPL-MCNC: 4.3 MG/DL (ref 2.5–4.9)
POTASSIUM SERPL-SCNC: 4.5 MMOL/L (ref 3.5–5.3)
SODIUM SERPL-SCNC: 143 MMOL/L (ref 136–145)
TRIGL SERPL-MCNC: 67 MG/DL (ref 0–149)
VLDL: 13 MG/DL (ref 0–40)

## 2023-11-08 NOTE — TELEPHONE ENCOUNTER
Pt reports severe headache for last 2-3 days.  Requests prednisone taper.  I sent in a script to her preferred pharmacy.

## 2023-11-10 LAB
ATRIAL RATE: 67 BPM
P AXIS: -20 DEGREES
P OFFSET: 184 MS
P ONSET: 123 MS
PR INTERVAL: 186 MS
Q ONSET: 216 MS
QRS COUNT: 11 BEATS
QRS DURATION: 92 MS
QT INTERVAL: 410 MS
QTC CALCULATION(BAZETT): 433 MS
QTC FREDERICIA: 425 MS
R AXIS: -23 DEGREES
T AXIS: 21 DEGREES
T OFFSET: 421 MS
VENTRICULAR RATE: 67 BPM

## 2023-11-11 DIAGNOSIS — R13.10 DYSPHAGIA: ICD-10-CM

## 2023-11-14 RX ORDER — PANTOPRAZOLE SODIUM 20 MG/1
20 TABLET, DELAYED RELEASE ORAL DAILY
Qty: 90 TABLET | Refills: 3 | Status: SHIPPED | OUTPATIENT
Start: 2023-11-14

## 2023-12-01 DIAGNOSIS — G43.709 CHRONIC MIGRAINE WITHOUT AURA WITHOUT STATUS MIGRAINOSUS, NOT INTRACTABLE: Primary | ICD-10-CM

## 2023-12-01 DIAGNOSIS — I10 PRIMARY HYPERTENSION: ICD-10-CM

## 2023-12-01 RX ORDER — BUMETANIDE 0.5 MG/1
0.5 TABLET ORAL DAILY
Qty: 30 TABLET | Refills: 1 | OUTPATIENT
Start: 2023-12-01

## 2023-12-01 RX ORDER — MONTELUKAST SODIUM 10 MG/1
10 TABLET ORAL DAILY
Qty: 90 TABLET | Refills: 1 | OUTPATIENT
Start: 2023-12-01

## 2023-12-01 NOTE — TELEPHONE ENCOUNTER
Patient was seen 11/7/23 in office. Med was refilled on 10/10/23 #90 with 3 refills. Should not need refill at this time.

## 2023-12-03 RX ORDER — BUTALBITAL, ACETAMINOPHEN AND CAFFEINE 50; 325; 40 MG/1; MG/1; MG/1
TABLET ORAL
Qty: 20 TABLET | Refills: 3 | Status: SHIPPED | OUTPATIENT
Start: 2023-12-03 | End: 2024-04-04 | Stop reason: SDUPTHER

## 2023-12-20 DIAGNOSIS — F41.9 ANXIETY: ICD-10-CM

## 2023-12-21 RX ORDER — HYDROXYZINE PAMOATE 25 MG/1
25 CAPSULE ORAL 3 TIMES DAILY PRN
Qty: 30 CAPSULE | Refills: 1 | Status: SHIPPED | OUTPATIENT
Start: 2023-12-21 | End: 2024-03-18

## 2024-01-31 NOTE — PROGRESS NOTES
Subjective   Reason for Visit: Dilma John is an 68 y.o. female here for a Medicare Wellness visit.               HPI  Since the patient's last office visit, the patient reports continued chronic cough productive of yellow sputum, chronic nasal congestion, chronic rhinorrhea, chronic postnasal drip, chronic sneezing, chronic constant watery eyes, purulent nasal discharge.  Since last spring, she also reports a history of hoarseness.  No other associated symptoms.    The patient reports that she has been experiencing spontaneous episodes of epistaxis from either or both nostrils times approximately 6 months.  She reports that the duration of each episode varies from a few minutes to 15 minutes.  She reports no other associated symptoms.    Over the past several months, the patient reports frequent episodes of nighttime wheezing and also reports that she has experienced episodes of wheezing with exposure to cold air.  She reports no other associated symptoms.    Since the patient lost her balance and fell backward in her yard in July 2023, she reports experiencing frequent episodes of pounding pain in the right occipital parietal region, frontal temporal regions, and in the vertex.  She reports that the duration of each episode is a few days..  She reports no precipitating factors.  She reports that the intensity of the pain sometimes increases with head movement.  She reports associated photophobia and nausea..  Since the patient's last office visit, she reports continued chronic constant vertigo, chronic constant sensation of being off balance.  She does report an increase in the intensity of the vertigo the day before she experiences one of the aforementioned episodes of pain.  She reports no other associated symptoms.    The patient reports a long history of occasional episodes of cramping pain in the epigastrium times years..  She reports that the duration of each episode is 1 day and that the episodes  transiently improve with defecation.  She reports associated passage of multiple liquid stools per day as well as nausea and vomiting..  No other associated symptoms.    The patient reports continued chronic intermittent episodes of urinary incontinence times years.  She reports that the episodes can occur when sneezing or coughing but that this occurs less often.  However, over the past few years, she has noted episodes associated with increased urgency.  She still reports continued chronic urinary urgency, chronic nocturia x 4 times years-unchanged...  No other associated symptoms.    Since the patient's bilateral total knee replacements, the patient reports a history of constant aching pain over the medial and lateral surfaces of both knees.  She reports an increase in the intensity of pain when standing for a long period of time.  She reports no associated swelling or instability.  No other associated symptoms.    The patient reports that she noted a boil in the right axilla 1-2 days ago.  She reports associated pain at the site when pressure is applied.  She reports no other associated symptoms.  The patient reports that she did squeeze the area yesterday and noted bloody drainage from the site.    The patient reports a long history of polydipsia times years especially at night.  She reports no other associated symptoms.  No other new complaints.      Patient Care Team:  Aaron Ruff MD as PCP - General (Internal Medicine)     Review of Systems  All systems have been reviewed and are normal except as previously noted  Objective   Vitals:  There were no vitals taken for this visit.      Physical Exam  Head-palpation revealed tenderness over the maxillary and frontal sinuses bilaterally.  Eyes-extraocular movements intact.  Pupils equal and react to light.  Fundi not well-visualized  Ears -palpation of each pinna and each tragus revealed no tenderness.  External auditory canals are narrow, not erythematous or  swollen, right TM clear, left TM not visualized secondary to impacted cerumen    Nose-turbinates not erythematous or swollen, no nasal septal deviation noted  Mouth--mild xerostomia noted.  Posterior pharynx not erythematous.  Tonsillar pillars appeared normal, no exudates  Neck-no lymphadenopathy.  Breast-no asymmetry or nipple discharge noted.  Palpation revealed no tenderness or masses, no axillary lymphadenopathy noted.  Lungs-clear  Cardiac-rate normal, rhythm regular, positive S4 noted, no murmurs, no JVD  Abdomen-soft, nondistended.  Slightly hypoactive bowel sounds.  Palpation did reveal mild tenderness on the right side of the epigastrium, no rebound tenderness or masses.  Liver percussed to 9 cm in total span  Pulses-2+ bilaterally in the upper extremities, 0 bilaterally in the lower extremities  Extremities-no peripheral edema  Musculoskeletal   Knees-no erythema or swelling.  Full range of motion with increased intensity of pain in all directions of motion.  Palpation revealed increased tenderness over both knees, no increase in warmth or crepitus.  Negative Lachemann's test, negative Jesusita's test, negative patellar apprehension test.  No pain or laxity of the collateral ligaments noted.  Skin-there is a 1 x 1 cm dark papule located in the center of the right axilla.  No necrotic tissue noted.  No drainage noted.  No surrounding erythema noted.  Palpation revealed the papule to be tender, nonmovable, no increase in warmth  Neurologic  Mental status-alert and oriented x3   Cranial nerves-2 through 12 grossly intact, no visual field abnormalities  Motor-no pronator drift noted, strength-5/5 in all muscle groups tested, , no tremor noted.  No bradykinesia noted.  No rigidity noted.  Negative pull test  Sensory-Light touch sensation fully intact  Pinprick sensation fully intact  Vibratory sensation fully intact  Cerebellar-no truncal ataxia, good coordination finger-nose testing,, good coordination  heel-to-shin testing, normal rapid alternating movements  Positive Romberg, poor coordination in tandem gait  Reflexes-1+/4 bilaterally    Julius-Hallpike maneuver positive with rotation of the head bilaterally right greater than left, positive latency, positive fatigability, negative habituation  Assessment/Plan   Problem List Items Addressed This Visit    None       Assessment    Coronary artery disease status post non-ST segment elevation myocardial infarction August 25, 2020  Heart failure with preserved ejection fraction  Hypertension-second blood pressure near goal today  Frequent nighttime episodes of wheezing, intermittent episodes of wheezing precipitated by exposure to cold air-likely secondary to undertreated asthma.  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  Fibrocystic disease of the breast, status post breast reduction remote  Long history of occasional episodes of nausea and vomiting-May be secondary to functional dyspepsia, IBS-D  Long history of occasional episodes of cramping pain in the epigastrium-May be secondary to IBS-D  Long history of intermittent episodes of diarrhea-May be secondary to IBS-D  Diverticulosis  Acute kidney injury in the setting of chronic kidney disease August 2020  Chronic kidney disease  Chronic nocturia x 4, chronic urgency, chronic frequent urination-probably secondary to overactive bladder  Chronic urinary incontinence occurring when sneezing but also associated with increased urgency-probably secondary to mixed urinary incontinence.  Urinary tract infections-recurrent  Right  adrenal mass  Chronic constant diffuse arthralgias and myalgias-May be secondary to fibromyalgia, osteoarthritis-diffuse.  Constant aching pain located over the medial and lateral surfaces of both knees-May be secondary to osteoarthritis  Osteoarthritis of the knees and status post left total knee replacement February 17, 2023; status post right total knee replacement March 7, 2022  Plantar fasciitis left heel  Multiple trigger points.  Presence of a papule located in the middle of the right axilla-may represent a small furuncle  Nighttime polydipsia-May represent a side effect from administration of multiple medications  Factor VII deficiency.  Frequent episodes of pounding pain right occipital parietal region, frontotemporal regions, vertex region-probably secondary to migraine headache  Chronic migraine headaches  Bilateral occipital neuralgia  Chronic constant vertigo-unsure of etiology.  May be secondary to vestibular neuronitis  Obstructive sleep apnea  Depression    Plan  Obtain CBC differential, CMP, fasting lipid profile, TSH, vitamin D level, vitamin B12 level, urinalysis today.  Obtain chest x-ray and x-rays of both knees today  I will be referring the patient to ENT for further evaluation and treatment.  I will be referring the patient back to an oncologist because of her history of CA of the right tonsil  Change tizanidine dosage to 6 mg p.o. nightly.  I did recommend that the patient use saline spray 2 sprays to each nostril 3-4 times per day as needed or that she insert nasal gel 2-3 times per day as needed  I did tell the patient to apply warm compresses to the presumed furuncle right axilla 5 to 10 minutes at a time 2-3 times per day as needed.

## 2024-02-01 ENCOUNTER — HOSPITAL ENCOUNTER (OUTPATIENT)
Dept: RADIOLOGY | Facility: CLINIC | Age: 69
Discharge: HOME | End: 2024-02-01
Payer: MEDICARE

## 2024-02-01 ENCOUNTER — OFFICE VISIT (OUTPATIENT)
Dept: PRIMARY CARE | Facility: CLINIC | Age: 69
End: 2024-02-01
Payer: MEDICARE

## 2024-02-01 ENCOUNTER — LAB (OUTPATIENT)
Dept: LAB | Facility: LAB | Age: 69
End: 2024-02-01
Payer: MEDICARE

## 2024-02-01 VITALS — DIASTOLIC BLOOD PRESSURE: 88 MMHG | SYSTOLIC BLOOD PRESSURE: 120 MMHG | HEART RATE: 74 BPM

## 2024-02-01 DIAGNOSIS — R07.89 OTHER CHEST PAIN: ICD-10-CM

## 2024-02-01 DIAGNOSIS — R80.9 PROTEINURIA, UNSPECIFIED TYPE: ICD-10-CM

## 2024-02-01 DIAGNOSIS — E78.5 DYSLIPIDEMIA: ICD-10-CM

## 2024-02-01 DIAGNOSIS — R42 VERTIGO: ICD-10-CM

## 2024-02-01 DIAGNOSIS — K21.00 GASTROESOPHAGEAL REFLUX DISEASE WITH ESOPHAGITIS WITHOUT HEMORRHAGE: ICD-10-CM

## 2024-02-01 DIAGNOSIS — M26.629 TEMPOROMANDIBULAR JOINT-PAIN-DYSFUNCTION SYNDROME: ICD-10-CM

## 2024-02-01 DIAGNOSIS — E55.9 VITAMIN D DEFICIENCY: ICD-10-CM

## 2024-02-01 DIAGNOSIS — I21.4 NON-ST ELEVATION MYOCARDIAL INFARCTION (NSTEMI), INITIAL CARE EPISODE (MULTI): ICD-10-CM

## 2024-02-01 DIAGNOSIS — R07.89 OTHER CHEST PAIN: Primary | ICD-10-CM

## 2024-02-01 DIAGNOSIS — F41.9 ANXIETY DISORDER, UNSPECIFIED TYPE: ICD-10-CM

## 2024-02-01 DIAGNOSIS — I10 PRIMARY HYPERTENSION: ICD-10-CM

## 2024-02-01 DIAGNOSIS — N18.31 STAGE 3A CHRONIC KIDNEY DISEASE (MULTI): ICD-10-CM

## 2024-02-01 DIAGNOSIS — M15.9 PRIMARY OSTEOARTHRITIS INVOLVING MULTIPLE JOINTS: ICD-10-CM

## 2024-02-01 DIAGNOSIS — R09.82 POST-NASAL DRIP: ICD-10-CM

## 2024-02-01 DIAGNOSIS — I15.0 RENOVASCULAR HYPERTENSION: ICD-10-CM

## 2024-02-01 DIAGNOSIS — R79.89 ELEVATED TSH: ICD-10-CM

## 2024-02-01 DIAGNOSIS — Z00.00 ROUTINE GENERAL MEDICAL EXAMINATION AT A HEALTH CARE FACILITY: ICD-10-CM

## 2024-02-01 LAB
25(OH)D3 SERPL-MCNC: 79 NG/ML (ref 30–100)
ALBUMIN SERPL BCP-MCNC: 4.3 G/DL (ref 3.4–5)
ALP SERPL-CCNC: 68 U/L (ref 33–136)
ALT SERPL W P-5'-P-CCNC: 9 U/L (ref 7–45)
ANION GAP SERPL CALC-SCNC: 16 MMOL/L (ref 10–20)
AST SERPL W P-5'-P-CCNC: 13 U/L (ref 9–39)
BASOPHILS # BLD AUTO: 0.06 X10*3/UL (ref 0–0.1)
BASOPHILS NFR BLD AUTO: 1.3 %
BILIRUB SERPL-MCNC: 0.3 MG/DL (ref 0–1.2)
BUN SERPL-MCNC: 30 MG/DL (ref 6–23)
CALCIUM SERPL-MCNC: 9.8 MG/DL (ref 8.6–10.6)
CHLORIDE SERPL-SCNC: 108 MMOL/L (ref 98–107)
CO2 SERPL-SCNC: 28 MMOL/L (ref 21–32)
CREAT SERPL-MCNC: 2.01 MG/DL (ref 0.5–1.05)
CREAT UR-MCNC: 190.3 MG/DL (ref 20–320)
CREAT UR-MCNC: 190.4 MG/DL (ref 20–320)
EGFRCR SERPLBLD CKD-EPI 2021: 27 ML/MIN/1.73M*2
EOSINOPHIL # BLD AUTO: 0.27 X10*3/UL (ref 0–0.7)
EOSINOPHIL NFR BLD AUTO: 5.8 %
ERYTHROCYTE [DISTWIDTH] IN BLOOD BY AUTOMATED COUNT: 14.5 % (ref 11.5–14.5)
GLUCOSE SERPL-MCNC: 86 MG/DL (ref 74–99)
HCT VFR BLD AUTO: 41.1 % (ref 36–46)
HGB BLD-MCNC: 12.7 G/DL (ref 12–16)
IMM GRANULOCYTES # BLD AUTO: 0.01 X10*3/UL (ref 0–0.7)
IMM GRANULOCYTES NFR BLD AUTO: 0.2 % (ref 0–0.9)
LYMPHOCYTES # BLD AUTO: 1.34 X10*3/UL (ref 1.2–4.8)
LYMPHOCYTES NFR BLD AUTO: 28.8 %
MCH RBC QN AUTO: 26.2 PG (ref 26–34)
MCHC RBC AUTO-ENTMCNC: 30.9 G/DL (ref 32–36)
MCV RBC AUTO: 85 FL (ref 80–100)
MONOCYTES # BLD AUTO: 0.47 X10*3/UL (ref 0.1–1)
MONOCYTES NFR BLD AUTO: 10.1 %
NEUTROPHILS # BLD AUTO: 2.5 X10*3/UL (ref 1.2–7.7)
NEUTROPHILS NFR BLD AUTO: 53.8 %
NRBC BLD-RTO: 0 /100 WBCS (ref 0–0)
PLATELET # BLD AUTO: 209 X10*3/UL (ref 150–450)
POC APPEARANCE, URINE: CLEAR
POC BILIRUBIN, URINE: NEGATIVE
POC BLOOD, URINE: NEGATIVE
POC COLOR, URINE: YELLOW
POC GLUCOSE, URINE: NEGATIVE MG/DL
POC KETONES, URINE: NEGATIVE MG/DL
POC LEUKOCYTES, URINE: NEGATIVE
POC NITRITE,URINE: NEGATIVE
POC PH, URINE: 6.5 PH
POC PROTEIN, URINE: NORMAL MG/DL
POC SPECIFIC GRAVITY, URINE: 1.02
POC UROBILINOGEN, URINE: 0.2 EU/DL
POTASSIUM SERPL-SCNC: 4.6 MMOL/L (ref 3.5–5.3)
PROT SERPL-MCNC: 6.7 G/DL (ref 6.4–8.2)
PROT SERPL-MCNC: 6.7 G/DL (ref 6.4–8.2)
PROT UR-ACNC: 40 MG/DL (ref 5–24)
PROT/CREAT UR: 0.21 MG/MG CREAT (ref 0–0.17)
RBC # BLD AUTO: 4.84 X10*6/UL (ref 4–5.2)
SODIUM SERPL-SCNC: 147 MMOL/L (ref 136–145)
TSH SERPL-ACNC: 7.06 MIU/L (ref 0.44–3.98)
VIT B12 SERPL-MCNC: 1107 PG/ML (ref 211–911)
WBC # BLD AUTO: 4.7 X10*3/UL (ref 4.4–11.3)

## 2024-02-01 PROCEDURE — 3074F SYST BP LT 130 MM HG: CPT | Performed by: INTERNAL MEDICINE

## 2024-02-01 PROCEDURE — 73560 X-RAY EXAM OF KNEE 1 OR 2: CPT | Mod: RT

## 2024-02-01 PROCEDURE — 84156 ASSAY OF PROTEIN URINE: CPT

## 2024-02-01 PROCEDURE — 3008F BODY MASS INDEX DOCD: CPT | Performed by: INTERNAL MEDICINE

## 2024-02-01 PROCEDURE — 82570 ASSAY OF URINE CREATININE: CPT

## 2024-02-01 PROCEDURE — 86320 SERUM IMMUNOELECTROPHORESIS: CPT | Performed by: INTERNAL MEDICINE

## 2024-02-01 PROCEDURE — 82306 VITAMIN D 25 HYDROXY: CPT

## 2024-02-01 PROCEDURE — 80053 COMPREHEN METABOLIC PANEL: CPT

## 2024-02-01 PROCEDURE — 86334 IMMUNOFIX E-PHORESIS SERUM: CPT

## 2024-02-01 PROCEDURE — 84165 PROTEIN E-PHORESIS SERUM: CPT

## 2024-02-01 PROCEDURE — 81002 URINALYSIS NONAUTO W/O SCOPE: CPT | Performed by: INTERNAL MEDICINE

## 2024-02-01 PROCEDURE — 1036F TOBACCO NON-USER: CPT | Performed by: INTERNAL MEDICINE

## 2024-02-01 PROCEDURE — 36415 COLL VENOUS BLD VENIPUNCTURE: CPT

## 2024-02-01 PROCEDURE — 82607 VITAMIN B-12: CPT

## 2024-02-01 PROCEDURE — 1126F AMNT PAIN NOTED NONE PRSNT: CPT | Performed by: INTERNAL MEDICINE

## 2024-02-01 PROCEDURE — 84443 ASSAY THYROID STIM HORMONE: CPT

## 2024-02-01 PROCEDURE — 73560 X-RAY EXAM OF KNEE 1 OR 2: CPT | Mod: LT

## 2024-02-01 PROCEDURE — 3079F DIAST BP 80-89 MM HG: CPT | Performed by: INTERNAL MEDICINE

## 2024-02-01 PROCEDURE — 1157F ADVNC CARE PLAN IN RCRD: CPT | Performed by: INTERNAL MEDICINE

## 2024-02-01 PROCEDURE — 1159F MED LIST DOCD IN RCRD: CPT | Performed by: INTERNAL MEDICINE

## 2024-02-01 PROCEDURE — 84155 ASSAY OF PROTEIN SERUM: CPT

## 2024-02-01 PROCEDURE — G0438 PPPS, INITIAL VISIT: HCPCS | Performed by: INTERNAL MEDICINE

## 2024-02-01 PROCEDURE — 85025 COMPLETE CBC W/AUTO DIFF WBC: CPT

## 2024-02-01 PROCEDURE — 84165 PROTEIN E-PHORESIS SERUM: CPT | Performed by: INTERNAL MEDICINE

## 2024-02-01 PROCEDURE — 99214 OFFICE O/P EST MOD 30 MIN: CPT | Performed by: INTERNAL MEDICINE

## 2024-02-01 PROCEDURE — 84439 ASSAY OF FREE THYROXINE: CPT

## 2024-02-01 PROCEDURE — 1170F FXNL STATUS ASSESSED: CPT | Performed by: INTERNAL MEDICINE

## 2024-02-01 ASSESSMENT — ACTIVITIES OF DAILY LIVING (ADL)
DOING_HOUSEWORK: INDEPENDENT
BATHING: INDEPENDENT
DRESSING: INDEPENDENT
GROCERY_SHOPPING: INDEPENDENT
MANAGING_FINANCES: INDEPENDENT
TAKING_MEDICATION: INDEPENDENT

## 2024-02-01 ASSESSMENT — ENCOUNTER SYMPTOMS: OCCASIONAL FEELINGS OF UNSTEADINESS: 1

## 2024-02-02 DIAGNOSIS — M25.561 CHRONIC PAIN OF BOTH KNEES: ICD-10-CM

## 2024-02-02 DIAGNOSIS — G89.29 CHRONIC PAIN OF BOTH KNEES: ICD-10-CM

## 2024-02-02 DIAGNOSIS — M25.562 CHRONIC PAIN OF BOTH KNEES: ICD-10-CM

## 2024-02-02 DIAGNOSIS — J30.9 ALLERGIC RHINITIS, UNSPECIFIED SEASONALITY, UNSPECIFIED TRIGGER: ICD-10-CM

## 2024-02-02 DIAGNOSIS — F41.9 ANXIETY DISORDER, UNSPECIFIED TYPE: ICD-10-CM

## 2024-02-02 DIAGNOSIS — R09.82 POST-NASAL DRIP: ICD-10-CM

## 2024-02-02 DIAGNOSIS — R79.89 ELEVATED TSH: Primary | ICD-10-CM

## 2024-02-02 DIAGNOSIS — M15.9 PRIMARY OSTEOARTHRITIS INVOLVING MULTIPLE JOINTS: ICD-10-CM

## 2024-02-02 LAB — T4 FREE SERPL-MCNC: 0.82 NG/DL (ref 0.78–1.48)

## 2024-02-06 LAB
ALBUMIN: 4.1 G/DL (ref 3.4–5)
ALPHA 1 GLOBULIN: 0.3 G/DL (ref 0.2–0.6)
ALPHA 2 GLOBULIN: 0.7 G/DL (ref 0.4–1.1)
BETA GLOBULIN: 0.7 G/DL (ref 0.5–1.2)
GAMMA GLOBULIN: 0.9 G/DL (ref 0.5–1.4)
IMMUNOFIXATION COMMENT: NORMAL
PATH REVIEW - SERUM IMMUNOFIXATION: NORMAL
PATH REVIEW-SERUM PROTEIN ELECTROPHORESIS: NORMAL
PROTEIN ELECTROPHORESIS COMMENT: NORMAL

## 2024-02-09 ENCOUNTER — OFFICE VISIT (OUTPATIENT)
Dept: CARDIOLOGY | Facility: CLINIC | Age: 69
End: 2024-02-09
Payer: MEDICARE

## 2024-02-09 ENCOUNTER — TELEPHONE (OUTPATIENT)
Dept: PRIMARY CARE | Facility: CLINIC | Age: 69
End: 2024-02-09

## 2024-02-09 VITALS
SYSTOLIC BLOOD PRESSURE: 141 MMHG | WEIGHT: 201 LBS | HEART RATE: 78 BPM | DIASTOLIC BLOOD PRESSURE: 93 MMHG | HEIGHT: 64 IN | BODY MASS INDEX: 34.31 KG/M2 | OXYGEN SATURATION: 95 %

## 2024-02-09 DIAGNOSIS — I15.0 RENOVASCULAR HYPERTENSION: ICD-10-CM

## 2024-02-09 DIAGNOSIS — I50.20 HEART FAILURE WITH REDUCED EJECTION FRACTION (MULTI): ICD-10-CM

## 2024-02-09 DIAGNOSIS — I21.4 NON-ST ELEVATION MYOCARDIAL INFARCTION (NSTEMI), INITIAL CARE EPISODE (MULTI): Primary | ICD-10-CM

## 2024-02-09 DIAGNOSIS — E78.5 DYSLIPIDEMIA: ICD-10-CM

## 2024-02-09 DIAGNOSIS — I10 PRIMARY HYPERTENSION: ICD-10-CM

## 2024-02-09 PROCEDURE — 99215 OFFICE O/P EST HI 40 MIN: CPT | Performed by: INTERNAL MEDICINE

## 2024-02-09 PROCEDURE — 3080F DIAST BP >= 90 MM HG: CPT | Performed by: INTERNAL MEDICINE

## 2024-02-09 PROCEDURE — 3008F BODY MASS INDEX DOCD: CPT | Performed by: INTERNAL MEDICINE

## 2024-02-09 PROCEDURE — 3077F SYST BP >= 140 MM HG: CPT | Performed by: INTERNAL MEDICINE

## 2024-02-09 PROCEDURE — 1036F TOBACCO NON-USER: CPT | Performed by: INTERNAL MEDICINE

## 2024-02-09 PROCEDURE — 1126F AMNT PAIN NOTED NONE PRSNT: CPT | Performed by: INTERNAL MEDICINE

## 2024-02-09 PROCEDURE — 1157F ADVNC CARE PLAN IN RCRD: CPT | Performed by: INTERNAL MEDICINE

## 2024-02-09 PROCEDURE — 1159F MED LIST DOCD IN RCRD: CPT | Performed by: INTERNAL MEDICINE

## 2024-02-09 RX ORDER — CLONIDINE HYDROCHLORIDE 0.1 MG/1
0.1 TABLET ORAL 3 TIMES DAILY
Qty: 270 TABLET | Refills: 3 | Status: SHIPPED | OUTPATIENT
Start: 2024-02-09 | End: 2025-02-08

## 2024-02-09 RX ORDER — ISOSORBIDE MONONITRATE 60 MG/1
60 TABLET, EXTENDED RELEASE ORAL DAILY
Qty: 90 TABLET | Refills: 3 | Status: SHIPPED | OUTPATIENT
Start: 2024-02-09 | End: 2025-02-08

## 2024-02-09 RX ORDER — HYDRALAZINE HYDROCHLORIDE 25 MG/1
TABLET, FILM COATED ORAL
Qty: 270 TABLET | Refills: 3 | OUTPATIENT
Start: 2024-02-09

## 2024-02-09 RX ORDER — BUMETANIDE 0.5 MG/1
0.5 TABLET ORAL DAILY
Qty: 90 TABLET | Refills: 3 | Status: SHIPPED | OUTPATIENT
Start: 2024-02-09 | End: 2025-02-08

## 2024-02-09 RX ORDER — VERAPAMIL HYDROCHLORIDE 120 MG/1
120 TABLET, FILM COATED ORAL 3 TIMES DAILY
Qty: 270 TABLET | Refills: 3 | Status: SHIPPED | OUTPATIENT
Start: 2024-02-09 | End: 2025-02-08

## 2024-02-09 RX ORDER — HYDRALAZINE HYDROCHLORIDE 25 MG/1
25 TABLET, FILM COATED ORAL 3 TIMES DAILY
Qty: 270 TABLET | Refills: 3 | Status: SHIPPED | OUTPATIENT
Start: 2024-02-09 | End: 2025-02-08

## 2024-02-09 ASSESSMENT — ENCOUNTER SYMPTOMS
LOSS OF SENSATION IN FEET: 0
DEPRESSION: 0
OCCASIONAL FEELINGS OF UNSTEADINESS: 1

## 2024-02-09 NOTE — PROGRESS NOTES
Chief Complaint:   Follow-up (3 month fuv)     History Of Present Illness:    Dilma John is a 68 y.o. female presenting with a pertinent medical history notable for obesity, obstructive sleep apnea since resolved with weight loss, chronic dyspnea on exertion, essential hypertension, chronic migraineheadaches, persistent asthma, anxiety who is seen in cardiology clinic for continued care.      Recall that she as being followed for Delayed Presentation NSTEMI. She was managed medically and had ischemic evaluation prior to discharge. Her course was complicated by Acute on Chronic Kidney Injury.. She has met with Nephrology who suggest ~ 10-15% risk of MAHAD and her kidney function is improving. She went forward with cardiac catheterization which did not reveal significant stenosis. It was felt that he event my have been stress related.      Since she was last seen, she is doing well. She unfortunately was diagnosed with Covid a around the new year of . States that she had cough, congestion, shortness of breath. She continues to deal with this. She was managed in the outpatient setting and did not require hospitalization. Reports continued issues of fatigue, malaise and most importantly osteoarthritic pain related to her knees. States that this has become burdensome. She had her right knee replaced in March. She continues to wait on the left knee. She is currently planned on traveling to [place] for vacation. She reports that prior to this appointment, she has not had any chest pain, pressure, shortness of breath      ( 2023) She returns for periopertive risk stratification. She has just returned from [place] after a  for her grandmother. She has been experiencing headaches and migraines which she attributes to missing her chronic migraine e medication.      Today ( 2023) she returns for follow up. She has completed her knee replacement and has progressed well with recovery. Since she was last  "seen, she has lost weight and feels dramatically better She is looking forward to riding her brand new bike with her new knees.     11/7/2023 -- She returns for follow up. She has had recurrent bouts of COVID ( 3rd Episode ) and reports that she had completed original panel. States that she was diagnosed with Home COVID-19 Screening  and notes that she tested positive in beginning of October. She did not reach out to her PCP.  Notes that she needed to use her Inhaler during this period but that she \"slept for 2 weeks\"  She has not started to exercise despite her knee replacements. She notes that she has been having several years of ongoing epistaxis and had undergone epistaxis X several issues,  She notes increased  abdominal pain and discomfort. She has been using lots of medications to address her various ills, but hasn't reached out to any of her providers.     2/9/2024 -- 6 she returns for follow-up.  She missed her appointment 1 day ago due to confusion but was able to make this appointment.  She continues to feel tired and continues to struggle with lower extremity pain secondary to surgical interventions.  She notes that she has had significant sinusitis, nasal dripping.  She has not reported any cardiovascular symptoms.   .     Last Recorded Vitals:  Vitals:    02/09/24 1020   BP: (!) 141/93   Patient Position: Sitting   Pulse: 78   SpO2: 95%   Weight: 91.2 kg (201 lb)   Height: 1.626 m (5' 4\")       Past Medical History:  She has a past medical history of Chronic sinusitis, unspecified (04/26/2021), Essential (primary) hypertension (07/05/2022), and Personal history of other diseases of the nervous system and sense organs (12/30/2021).    Past Surgical History:  She has a past surgical history that includes Other surgical history (04/17/2019); Other surgical history (04/17/2019); Other surgical history (04/17/2019); and MR angio head wo IV contrast (6/13/2013).      Social History:  She reports that she has " never smoked. She has never used smokeless tobacco. She reports that she does not currently use alcohol. She reports that she does not use drugs.    Family History:  Family History   Problem Relation Name Age of Onset    Arthritis Mother      Asthma Other      Diabetes Other      Hypertension Other      Heart attack Other      Stroke Other          Allergies:  Buspirone, Cefazolin, Clindamycin, Donepezil, Erythromycin, Ibuprofen, Latex, Levofloxacin, Nsaids (non-steroidal anti-inflammatory drug), Sulfa (sulfonamide antibiotics), Benadryl allergy decongestant, and Penicillins    Outpatient Medications:  Current Outpatient Medications   Medication Instructions    albuterol 90 mcg/actuation inhaler 2 puffs, inhalation, Every 6 hours PRN    albuterol 2.5 mg, inhalation, Every 6 hours PRN    allopurinol (Zyloprim) 100 mg tablet TAKE 1 TABLET BY MOUTH EVERY DAY    azelastine (Astelin) 137 mcg (0.1 %) nasal spray 2 sprays, nasal, 2 times daily    benzonatate (Tessalon) 200 mg capsule 1 capsule, oral, 3 times daily PRN    bumetanide (BUMEX) 0.5 mg, oral, Daily    busPIRone (Buspar) 15 mg tablet TAKE 1/2 TABLET BY MOUTH TWICE A DAY    butalbital-acetaminophen-caff -40 mg tablet TAKE 1 TABLET BY MOUTH AT ONSET OF HEADACHE THEN UP TO EVERY 6 HRS AS NEEDED. MAX 3 TABS PER DAY    cholecalciferol (Vitamin D-3) 50 mcg (2,000 unit) capsule 1 capsule, oral, Daily    cloNIDine (CATAPRES) 0.1 mg, oral, 3 times daily    diclofenac sodium 1.5 % drops 4 drops, Topical, 4 times daily PRN    DULoxetine (Cymbalta) 60 mg DR capsule 2 capsules, oral, Nightly    erythromycin base (E-MYCIN) 1,000 mg, oral, ONE HOUR BEFORE THE DENTAL PROCEDURE<BR>    fluticasone (Flonase) 50 mcg/actuation nasal spray 1 spray, nasal, 2 times daily    fluticasone (Flovent HFA) 110 mcg/actuation inhaler 2 puffs, inhalation, 2 times daily RT    gabapentin (Neurontin) 600 mg tablet 2 tablets, oral, 3 times daily    hydrALAZINE (APRESOLINE) 25 mg, oral, 3 times  "daily    hydrOXYzine pamoate (VISTARIL) 25 mg, oral, 3 times daily PRN    isosorbide mononitrate ER (IMDUR) 60 mg, oral, Daily    meclizine (Antivert) 12.5 mg tablet TAKE 1 TABLET BY MOUTH THREE TIMES A DAY AS NEEDED    metoclopramide (REGLAN) 10 mg, oral, 2 times daily    multivitamin (Multiple Vitamins) tablet 1 tablet, oral, Daily    neomycin-polymyxin-HC (Cortisporin) otic solution 3 drops, Each Ear, 3-4 TIMES DAILY.    nitroglycerin (NITROSTAT) 0.3 mg, sublingual, Every 5 min PRN    ondansetron (Zofran) 4 mg tablet 1 tablet, oral, Every 8 hours PRN    pantoprazole (PROTONIX) 20 mg, oral, Daily    tiZANidine (ZANAFLEX) 4 mg, oral, Nightly    venlafaxine (EFFEXOR) 37.5 mg, oral, Daily    verapamil (CALAN) 120 mg, oral, 3 times daily       Physical Exam:  Physical Exam   BP (!) 141/93 (Patient Position: Sitting)   Pulse 78   Ht 1.626 m (5' 4\")   Wt 91.2 kg (201 lb)   SpO2 95%   BMI 34.50 kg/m²   BP (!) 141/93 (Patient Position: Sitting)   Pulse 78   Ht 1.626 m (5' 4\")   Wt 91.2 kg (201 lb)   SpO2 95%   BMI 34.50 kg/m²     General Appearance:  Alert, cooperative, no distress, appears stated age   Head:  Normocephalic, without obvious abnormality, atraumatic   Nose: Nares normal, septum midline,mucosa normal, no drainage or sinus tenderness   Throat: Lips, mucosa, and tongue normal; teeth and gums normal   Back:   Symmetric, no curvature, ROM normal, no CVA tenderness   Lungs:   Clear to auscultation bilaterally, respirations unlabored   Heart:  Regular rate and rhythm, S1 and S2 normal, no murmur, rub, or gallop   Abdomen:   Soft, non-tender, bowel sounds active all four quadrants,  no masses, no organomegaly   Extremities: Extremities normal, atraumatic, no cyanosis or edema   Pulses: 2+ and symmetric   Skin: Skin color, texture, turgor normal, no rashes or lesions   Lymph nodes: Cervical, supraclavicular, and axillary nodes normal   Neurologic: Normal          Last Labs:  CBC -  Lab Results   Component " Value Date    WBC 4.7 02/01/2024    HGB 12.7 02/01/2024    HCT 41.1 02/01/2024    MCV 85 02/01/2024     02/01/2024       CMP -  Lab Results   Component Value Date    CALCIUM 9.8 02/01/2024    PHOS 4.3 11/07/2023    PROT 6.7 02/01/2024    PROT 6.7 02/01/2024    ALBUMIN 4.3 02/01/2024    AST 13 02/01/2024    ALT 9 02/01/2024    ALKPHOS 68 02/01/2024    BILITOT 0.3 02/01/2024       LIPID PANEL -   Lab Results   Component Value Date    CHOL 194 11/07/2023    TRIG 67 11/07/2023    HDL 95.5 11/07/2023    CHHDL 2.0 11/07/2023    LDLF 104 (H) 10/14/2019    VLDL 13 11/07/2023    NHDL 99 11/07/2023       RENAL FUNCTION PANEL -   Lab Results   Component Value Date    GLUCOSE 86 02/01/2024     (H) 02/01/2024    K 4.6 02/01/2024     (H) 02/01/2024    CO2 28 02/01/2024    ANIONGAP 16 02/01/2024    BUN 30 (H) 02/01/2024    CREATININE 2.01 (H) 02/01/2024    CALCIUM 9.8 02/01/2024    PHOS 4.3 11/07/2023    ALBUMIN 4.3 02/01/2024        Lab Results   Component Value Date     (H) 11/07/2023       Last Cardiology Tests:  ECG:    In Office EKG 2/9/2024 --   In Office EKG 11/7/2023 -- Sinus Rhythm @ 67 BPM       Echo:  Echocardiogram 1/2023   1. Left ventricular systolic function is normal with a 65-70% estimated ejection fraction.   2. Spectral Doppler shows an impaired relaxation pattern of left ventricular diastolic filling.   3. Severely increased left ventricular septal thickness.   4. The left ventricular posterior wall thickness is severely increased.   5. Mild aortic valve stenosis.   6. Left ventricular cavity size is decreased.    Cath:  Coronary Angiography:  The coronary circulation is right dominant.     Coronary Angiography Comments:  There is only branch vessel coronary artery disease in this right dominant system. The left main is dilated but normal. The wraparound LAD and diagonal branches are dilated and tortuous but otherwise unremarkable. The LCx and OM branches are also tortuous but  otherwise free of disease. The large dominant RCA is dilated and tortuous but otherwise unremarkable. The sizable RPDA and first posterolateral branch are free of significant disease. A small second posterolateral branch appears to be diffusely diseased and narrowed up to 90% in its proximal to mid segment. This has the appearance of a possible spontaneous coronary artery dissection (SCAD).        Left Main Coronary Artery:  The left main coronary artery is a normal caliber vessel. The left main arises normally from the left coronary sinus of Valsalva and bifurcates into the LAD and circumflex coronary arteries. The left main coronary artery showed no significant disease or stenosis greater than 30%.     Left Anterior Descending Coronary Artery Distribution:  The left anterior descending coronary artery is a normal caliber vessel. The LAD arises normally from the left main coronary artery. The LAD demonstrated no significant disease or stenosis greater than 30%.     Circumflex Coronary Artery Distribution:  The circumflex coronary artery is a normal caliber vessel. The circumflex arises normally from the left main coronary artery and terminates in the AV groove. The circumflex revealed no significant disease or stenosis greater than 30%.     Right Coronary Artery Distribution:     The right coronary artery is a normal caliber vessel. The RCA arises normally from the right sinus of Valsalva. The RCA showed no significant disease or stenosis greater than 30%.  The proximal to mid right posterolateral branch showed 90% stenosis.        Valve Findings:  No aortic valve stenosis is visualized.     Coronary Lesion Summary:  Vessel   Stenosis   Vessel Segment  RPL    90% stenosis proximal to mid     Stress Test:  Stress Test -- Pharmacological   FINDINGS:  Rest images demonstrate a normal distribution of perfusion throughout all LV segments. Stress images demonstrate a normal distribution of perfusion throughout all LV  "segments except for moderate reduction in  perfusion of the mid and distal anteroseptal wall concerning for ischemia/reversibility most likely in the LAD territory.     TID: 1.14     ECG-gated images demonstrate normal LV size (EDV 100ml) and mild to moderately reduced myocardial contractility with an LV ejection fraction of 38 % (normal above 45 percent).   There is hypokinesis of the mid and distal anterior septal wall.     IMPRESSION:  1. Abnormal stress myocardial perfusion imaging in response to pharmacologic stress moderate reduction in perfusion of the mid and distal anteroseptal wall concerning for ischemia. Results were discussed with Dr. Marino of Cardiology.  2. Mild to moderately reduced EF of 38% with hypokinesis of the mid and distal anterior septal wall.  Cardiac Imaging:  No results found for this or any previous visit from the past 1095 days.        Lab review: I have personally reviewed the laboratory result(s)   Diagnostic review: I have personally reviewed the result(s) of the EKG and Echocardiogram .   Imaging review: I have  personally reviewed the result(s)     Assessment/Plan     /  Problem List Items Addressed This Visit       Dyslipidemia    Overview     The ASCVD Risk score (Chazy DK, et al., 2019) failed to calculate for the following reasons:    The patient has a prior MI or stroke diagnosis    Lab Results   Component Value Date    CHOL 194 11/07/2023    CHOL 231 (H) 10/14/2019    CHOL 176 08/30/2018     Lab Results   Component Value Date    HDL 95.5 11/07/2023    .7 10/14/2019    HDL 86.2 08/30/2018     Lab Results   Component Value Date    LDLCALC 85 11/07/2023     Lab Results   Component Value Date    TRIG 67 11/07/2023    TRIG 81 10/14/2019    TRIG 71 08/30/2018   No components found for: \"CHOLHDL\"           Current Assessment & Plan     She had previously been on pravastatin which was subsequently stopped.  In light of significantly elevated HDL which exceeds LDL, is likely " appropriate that she remain off.  --Can consider alternative cholesterol-lowering agents in the future         Relevant Orders    Lipid Panel    Comprehensive metabolic panel    Heart failure with reduced ejection fraction (CMS/HCC)    Overview     Ejection fraction was felt to be low in the setting of scad presentation.  As of echocardiogram 1/2023, ejection fraction had normalized to 65 to 70% with an impaired relaxation diastolic filling pattern.         Current Assessment & Plan     Continue to address cardiovascular disease risk, control blood pressure         Relevant Medications    isosorbide mononitrate ER (Imdur) 60 mg 24 hr tablet    verapamil (Calan) 120 mg tablet    Other Relevant Orders    CBC and Auto Differential    B-Type Natriuretic Peptide    Troponin I, High Sensitivity    Comprehensive metabolic panel    Hypertension    Relevant Medications    bumetanide (Bumex) 0.5 mg tablet    cloNIDine (Catapres) 0.1 mg tablet    hydrALAZINE (Apresoline) 25 mg tablet    isosorbide mononitrate ER (Imdur) 60 mg 24 hr tablet    verapamil (Calan) 120 mg tablet    Other Relevant Orders    CBC and Auto Differential    Comprehensive metabolic panel    Comprehensive metabolic panel    Non-ST elevation myocardial infarction (NSTEMI), initial care episode (CMS/[facility]) - Primary    Overview     NSTEMI in the setting of presumed SCAD with Right Posterolateal Branch   -- Medically Managed          Relevant Medications    isosorbide mononitrate ER (Imdur) 60 mg 24 hr tablet    verapamil (Calan) 120 mg tablet    Other Relevant Orders    CBC and Auto Differential    Lipid Panel    Comprehensive metabolic panel               Noman Marino DO

## 2024-02-09 NOTE — PATIENT INSTRUCTIONS
"It was a pleasure seeing you today. I would like to see you back in clinic in  3    months. You can call my office if questions arise between now and our next visit.     Today, we talked about many different issues   -- Please let your doctors know if you are having issues.     -- Please consider going to the ER if you continue to have stomach issues and pain     -- Please let us know as many of your medications can causing worsening renal function or other issues if you take too much when you are feeling unwell.     -- Please have blood work done.    Below are some Heart Health Tips that we provide to all of our patients. I hope you fing them useful.     - If you are having problems with medications, consider looking at the following websites.   --  \"[employer]\"   --  \"[facility]\"      - We are happy to supply written prescriptions if needed to allow you to obtain your medications from different pharmacies. Additionally, if you are having issues with mail order delivery, please let us know. We can send a limited supply of your medications to your local pharmacy.     -  We recommend you follow a heart healthy diet. Watch food labels and try not to eat more than 2,500 mg of sodium per day. Avoid foods high in salt like processed meats (lunch meats, birch, and sausage), processed foods (boxed dinners, canned soups), fried and fast foods. Monitor serving sizes and if the sodium per serving size is more than 200 mg, avoid those foods. If the sodium per serving size is between 100-200 mg, you can use those in limited quantities. Try to choose foods where the amount of sodium per serving size is less than 100 mg. Try to eat a diet rich in fruits and vegetables, whole grains, low fat dairy products, skinless poultry and fish, nuts, beans, non-tropical vegetable oils. Limit saturated fat, trans fat, sodium, red meats, and sugar-sweetened beverages.   Limit alcohol     -The combination of a reduced-calorie " diet and increased physical activity is recommended. Adults should aim to get at least 150 minutes of moderate physical activity per week (30 minutes of moderate physical activities at least 5 days per week). Examples of moderate physical activities include brisk walking, swimming, aerobic dancing, heavy gardening, jumping rope, bicycling 10 MPH or faster, tennis, hiking uphill or with a heavy backpack. Please let us know if you would like to learn more about your nutrition and calories and additional options including weight loss programs to help you reach your goal.     -If you smoke, stop smoking. If you stop smoking you can help get rid of a major source of stress to your heart. Smoking makes your heart rate and blood pressure go up and increases your risk or developing cardiovascular diseases and worsen symptoms associated with heart failure.     -Obtain a BP monitor and monitor your BP daily. Check it around the same time each day; at least 1 hour after taking your medications. Record your BP in a log and bring your log with you to your doctors appointment.     -F/u with your PCP as recommended.

## 2024-02-12 ENCOUNTER — OFFICE VISIT (OUTPATIENT)
Dept: OTOLARYNGOLOGY | Facility: CLINIC | Age: 69
End: 2024-02-12
Payer: MEDICARE

## 2024-02-12 VITALS — WEIGHT: 201 LBS | BODY MASS INDEX: 34.5 KG/M2

## 2024-02-12 DIAGNOSIS — R42 DIZZINESS AND GIDDINESS: ICD-10-CM

## 2024-02-12 DIAGNOSIS — R09.82 POST-NASAL DRIP: ICD-10-CM

## 2024-02-12 DIAGNOSIS — R49.0 HOARSENESS: ICD-10-CM

## 2024-02-12 DIAGNOSIS — J30.9 ALLERGIC RHINITIS, UNSPECIFIED SEASONALITY, UNSPECIFIED TRIGGER: ICD-10-CM

## 2024-02-12 DIAGNOSIS — K21.9 CHRONIC GERD: ICD-10-CM

## 2024-02-12 PROCEDURE — 99214 OFFICE O/P EST MOD 30 MIN: CPT | Performed by: GENERAL PRACTICE

## 2024-02-12 PROCEDURE — 1036F TOBACCO NON-USER: CPT | Performed by: GENERAL PRACTICE

## 2024-02-12 PROCEDURE — 1126F AMNT PAIN NOTED NONE PRSNT: CPT | Performed by: GENERAL PRACTICE

## 2024-02-12 PROCEDURE — 1157F ADVNC CARE PLAN IN RCRD: CPT | Performed by: GENERAL PRACTICE

## 2024-02-12 PROCEDURE — 1159F MED LIST DOCD IN RCRD: CPT | Performed by: GENERAL PRACTICE

## 2024-02-12 PROCEDURE — 3008F BODY MASS INDEX DOCD: CPT | Performed by: GENERAL PRACTICE

## 2024-02-12 RX ORDER — OLOPATADINE HYDROCHLORIDE 2 MG/ML
1 SOLUTION/ DROPS OPHTHALMIC DAILY PRN
Qty: 2.5 ML | Refills: 2 | Status: SHIPPED | OUTPATIENT
Start: 2024-02-12 | End: 2025-02-11

## 2024-02-12 RX ORDER — MOMETASONE FUROATE 50 UG/1
2 SPRAY, METERED NASAL 2 TIMES DAILY
Qty: 17 G | Refills: 2 | Status: SHIPPED | OUTPATIENT
Start: 2024-02-12 | End: 2024-03-07

## 2024-02-12 RX ORDER — CETIRIZINE HYDROCHLORIDE 10 MG/1
10 TABLET ORAL DAILY PRN
Qty: 30 TABLET | Refills: 2 | Status: SHIPPED | OUTPATIENT
Start: 2024-02-12 | End: 2024-03-06

## 2024-02-12 RX ORDER — FAMOTIDINE 20 MG/1
20 TABLET, FILM COATED ORAL NIGHTLY
Qty: 30 TABLET | Refills: 2 | Status: CANCELLED | OUTPATIENT
Start: 2024-02-12 | End: 2025-02-11

## 2024-02-12 RX ORDER — MAGNESIUM CARB/ALUMINUM HYDROX 105-160MG
1 TABLET,CHEWABLE ORAL NIGHTLY
Qty: 60 TABLET | Refills: 0 | Status: SHIPPED | OUTPATIENT
Start: 2024-02-12 | End: 2024-04-12

## 2024-02-12 ASSESSMENT — PATIENT HEALTH QUESTIONNAIRE - PHQ9
1. LITTLE INTEREST OR PLEASURE IN DOING THINGS: NOT AT ALL
SUM OF ALL RESPONSES TO PHQ9 QUESTIONS 1 AND 2: 0
2. FEELING DOWN, DEPRESSED OR HOPELESS: NOT AT ALL

## 2024-02-12 NOTE — ASSESSMENT & PLAN NOTE
She had previously been on pravastatin which was subsequently stopped.  In light of significantly elevated HDL which exceeds LDL, is likely appropriate that she remain off.  --Can consider alternative cholesterol-lowering agents in the future

## 2024-02-13 NOTE — PROGRESS NOTES
Otolaryngology - Head and Neck Surgery Outpatient New Patient Visit Note        Assessment/Plan   Problem List Items Addressed This Visit             ICD-10-CM       ENT    Allergic rhinitis J30.9    Relevant Medications    cetirizine (ZyrTEC) 10 mg tablet    mometasone (Nasonex) 50 mcg/actuation nasal spray    olopatadine (Pataday) 0.2 % ophthalmic solution    Post-nasal drip R09.82    Relevant Medications    cetirizine (ZyrTEC) 10 mg tablet    mometasone (Nasonex) 50 mcg/actuation nasal spray     Other Visit Diagnoses         Codes    Hoarseness     R49.0    Relevant Orders    Referral to ENT    Chronic GERD     K21.9    Relevant Medications    aluminum hydrox-magnesium carb (Gaviscon Extra Strength) 160-105 mg tablet,chewable    Other Relevant Orders    Referral to ENT    Dizziness and giddiness     R42    Relevant Orders    Referral to Audiology    Vestibular Testing            68yoF with prior XRT/chemo for R oropharyngeal CA who presents for 1y of increased allergic rhinitis symptoms as well as hoarseness.    No focal lesions on exam (though right posterior nasopharyngeal pulsatile fullness with medialized carotid), but some posterior supraglottic and glottic mild edema.  Discussed controls for rhinitis to reduce PND and increased controls for GERD.  Discussed referral to laryngology for 2nd eval and ongoing voice management.      Pt reports some chronic dysequlbrium and intermittent vertigo, suspicious for vestibular migraines as well as possible peripheral vestibulopathy.  Will acquire VNG to aid in eval.           Follow up:  -plan for follow up in clinic as needed and in 1-2 months    All of the above findings, impressions, treatment planning and follow up plans were discussed with the patient who indicated understanding.  the patient was instructed to contact or return to clinic sooner if symptoms/signs persist or worsen despite the above management.      Marco Antonio Rnad MD  Otolaryngology - Head and Neck  Surgery            History Of Present Illness  Dilma John is a 68 y.o. female presenting for bothersome allergy symptoms.   Notes nasal congestion, nonpurulent rhinorhea, itching/watering eyes.   Reports no improvement with singulair and minimal relief with recent mucinexDM.    Notes intermittent epistaxis and intermittent blood in sputum.   Notes sometimes coughing up blood without any apparent nasal source.      Notes some chronic globus and chronic hoarseness.  Notes breathy/hoarse voice for approx 1y which is stable without obvious palliative/provocative factors, though worse with heavy use.     Notes a history of R oropharyngeal cancer treated with chemoXRT in approx 2012.  Lost to oncology follow up and notes she has a scheduled follow up in approx 1 week.      Notes a history of intermittent dizziness.   Notes some diziness/presyncope with rising quickly or prolonged standing.   Notes some RS vertigo episodes which last longer, and are often associated with migraine headaches.                Past Medical History  She has a past medical history of Chronic sinusitis, unspecified (04/26/2021), Essential (primary) hypertension (07/05/2022), and Personal history of other diseases of the nervous system and sense organs (12/30/2021).    Surgical History  She has a past surgical history that includes Other surgical history (04/17/2019); Other surgical history (04/17/2019); Other surgical history (04/17/2019); and MR angio head wo IV contrast (6/13/2013).     Social History  She reports that she has never smoked. She has never used smokeless tobacco. She reports that she does not currently use alcohol. She reports that she does not use drugs.    Family History  Family History   Problem Relation Name Age of Onset    Arthritis Mother      Asthma Other      Diabetes Other      Hypertension Other      Heart attack Other      Stroke Other          Allergies  Buspirone, Cefazolin, Clindamycin, Donepezil, Erythromycin,  Ibuprofen, Latex, Levofloxacin, Nsaids (non-steroidal anti-inflammatory drug), Sulfa (sulfonamide antibiotics), Benadryl allergy decongestant, and Penicillins    Review of Systems  ROS: Pertinent positives as noted in HPI.    - CONSTITUTIONAL: Does not report weight loss, fever or chills.    - HEENT:   Ear: Does not report tinnitus,  ,    , otalgia, otorrhea  Nose: Does not report  ,  , epistaxis, decreased smell  Throat: Does not report pain, dysphagia, odynophagia  Larynx: Does not report  ,  difficulty breathing, pain with speaking (odynophonia)  Neck: Does not report new masses, pain, swelling  Face: Does not report sinus pain, pressure, swelling, numbness, weakness     - RESPIRATORY: Does not report SOB or cough.    - CV: Does not report palpitations or chest pain.     - GI: Does not report abdominal pain, nausea, vomiting or diarrhea.    - : Does not report dysuria or urinary frequency.    - MSK: Does not report myalgia or joint pain.    - SKIN: Does not report rash or pruritus.    - NEUROLOGICAL: Does not report headache or syncope.    - PSYCHIATRIC: Does not report recent changes in mood. Does not report anxiety or depression.         Physical Exam:     GENERAL:   Alert & Oriented to person, place and time; Normal affect and appearance. Well developed and well nourished. Conversant & cooperative with examination.     HEAD:   Normocephalic, atraumatic. No sinus tenderness to palpation. Normal parotid bilaterally. Normal facial strength.     NEUROLOGIC:   Cranial nerves II-XII grossly intact, gait WNL. Normal mood and affect.    EYES:   Extraocular movements intact. Pupils equal, round, reactive to light and accommodation. No nystagmus, no ptosis. no scleral injection.    EAR:   Normal auricle. No discomfort or TTP with manipulation.   Handheld otoscopic exam showed normal external auditory canals bilaterally. No purulence or EAC inflammation. Minimal cerumen.   Right tympanic membrane clear and mobile  without evidence of perforation, retraction or middle ear effusion.   Left tympanic membrane clear and mobile without evidence of perforation, retraction or middle ear effusion.     NOSE:   No external deformity. No external nasal lesions, lacerations, or scars. Nasal tip symmetrical with normal nasal valves.   Nasal cavity with essentially midline septum,  edematous  mucosa and turbinates. No lesions, masses, purulence or polyps.   Right posterior nasopharyngeal wall pulsatile submucosal fullness without discrete mucosal lesion.      OC/OP:   Mucous membranes moist, no masses, lesions or exudates.   Normal tongue, floor of mouth, teeth, gums, lips. Normal posterior pharyngeal wall.    Normal tonsils without erythema, exudate or obvious calculi     NECK:   No neck masses or thyroid enlargement. Trachea midline. No tenderness to palpation    LYMPHATIC:   No cervical lymphadenopathy.     RESPIRATORY:   Symmetric chest elevation & no retractions. No significant hoarseness. No increased work of breathing.    CV:   No clubbing or cyanosis. No obvious edema    Skin:   No facial rashes, vesicles or lesions.     Extremities:   No gross abnormalities      Clinic Procedure    Nasal Endoscopy Laryngoscopy Nasophrayngosocopy   Procedure: flexible fiberoptic laryngoscopy, nasopharyngoscopy.   Flexible Fiberoptic Laryngoscopy Indication:   inability to tolerate mirror exam     Risks, benefits, and alternatives, infection risk, bleeding risk and risk of mucosal trauma and pain were discussed with the patient. Verbal consent was obtained prior to the procedure and is detailed in the patient's record.     Procedure Note:      With the patient seated in the exam chair, the bilateral nasal cavity was topically treated with 4% lidocaine and phenylephrine.  The bilateral nasal cavity was intubated with the flexible laryngoscope.   Nasal Endoscopy: Nasal endoscopy was successfully completed using the endoscope and the nasal mucosa,  septum, turbinates, and osteomeatal complex were examined.  Nasopharyngoscopy: Nasopharyngoscopy was successfully completed using the endoscope and the nasal mucosa, septum, turbinates, osteomeatal complex, and nasopharynx were examined.   Laryngoscopy: Laryngoscopy was successfully completed using the flexible laryngoscope and the nasopharynx, hypopharynx, and larynx were examined.  Patient Status: the patient tolerated the procedure well.   Complications: there were no complications.      . After obtaining adequate decongestion and anesthesia, the scope was introduced into the floor of the nasal cavity. The septum, inferior, and middle turbinates were without any mucosal lesions.  The Fossa of [place]mueller were clear bilaterally, and good velopharyngeal closure was obtained on phonation.  Base of tongue, tonsillar complex, and posterior pharyngeal wall with smooth post-XRT appearance but free of asymmetry or concerning ulcerations or masses.  supraglottic segments with edema, pachydermia and erythema at the esophageal opening and posterior supraglottis.  scattered mucous debris.  No mucosal ulcerations or masses.  True vocal cords edematous but abduct and adduct normally with no mucosal or mass lesions.  No masses visualized in the pyriform recesses.  The scope was removed and patient tolerated the procedure well.      Information review:  External sources (notes, imaging, lab results) listed below personally reviewed to aid in medical decision making process.  -  -  -

## 2024-02-15 ENCOUNTER — HOSPITAL ENCOUNTER (OUTPATIENT)
Dept: RADIOLOGY | Facility: CLINIC | Age: 69
Discharge: HOME | End: 2024-02-15
Payer: MEDICARE

## 2024-02-15 DIAGNOSIS — R07.89 OTHER CHEST PAIN: ICD-10-CM

## 2024-02-15 PROCEDURE — 71046 X-RAY EXAM CHEST 2 VIEWS: CPT | Performed by: RADIOLOGY

## 2024-02-15 PROCEDURE — 71046 X-RAY EXAM CHEST 2 VIEWS: CPT

## 2024-02-19 ENCOUNTER — APPOINTMENT (OUTPATIENT)
Dept: ORTHOPEDIC SURGERY | Facility: CLINIC | Age: 69
End: 2024-02-19
Payer: MEDICARE

## 2024-02-20 NOTE — PROGRESS NOTES
Subjective   Patient ID: Dilma John is a 68 y.o. female who presents for follow-up visit    HPI   The patient reports no new complaints.  We went over her medication list in great detail and it turns out that she probably has not been using duloxetine.  She is also on no maintenance therapy for asthma.  Review of Systems    Objective   There were no vitals taken for this visit.    Physical Exam  Lungs-clear  Cardiac-rate normal rhythm regular, positive S4 noted, no murmurs, no JVD  Extremities-no peripheral edema  Assessment/Plan        Assessment  Coronary artery disease status post non-ST segment elevation myocardial infarction August 25, 2020  Heart failure with preserved ejection fraction  Hypertension-blood pressure at goal today  Chronic frequent nighttime episodes of wheezing, chronic intermittent episodes of wheezing precipitated by exposure to cold air-likely secondary to undertreated asthma  Asthma  Chronic cough, chronic nasal congestion, chronic rhinorrhea, chronic postnasal drip, chronic sneezing, chronic hoarseness-probably secondary to allergic and nonallergic rhinitis  Allergic rhinitis  Nonallergic rhinitis  Chronic kidney disease  Elevated TSH-May be secondary to subclinical hypothyroidism  Factor VII deficiency  Chronic migraine headaches  Bilateral occipital neuralgia  Obstructive sleep apnea  Depression  Plan  Begin Trelegy 100/50 1 puff daily  I will arrange for the patient to have a new aerosol machine.  I have refilled her prescription for duloxetine 120 mg daily  I urged the patient to keep her scheduled appointments with her oncologist and orthopedist  She will return for an office visit and lab work in 6 months.

## 2024-02-21 ENCOUNTER — OFFICE VISIT (OUTPATIENT)
Dept: PRIMARY CARE | Facility: CLINIC | Age: 69
End: 2024-02-21
Payer: MEDICARE

## 2024-02-21 VITALS
BODY MASS INDEX: 35.53 KG/M2 | WEIGHT: 207 LBS | HEART RATE: 70 BPM | DIASTOLIC BLOOD PRESSURE: 70 MMHG | SYSTOLIC BLOOD PRESSURE: 110 MMHG

## 2024-02-21 DIAGNOSIS — G89.29 CHRONIC PAIN OF BOTH KNEES: ICD-10-CM

## 2024-02-21 DIAGNOSIS — J37.0 CHRONIC LARYNGITIS: ICD-10-CM

## 2024-02-21 DIAGNOSIS — R11.0 NAUSEA IN ADULT: ICD-10-CM

## 2024-02-21 DIAGNOSIS — I15.0 RENOVASCULAR HYPERTENSION: Primary | ICD-10-CM

## 2024-02-21 DIAGNOSIS — M25.561 CHRONIC PAIN OF BOTH KNEES: ICD-10-CM

## 2024-02-21 DIAGNOSIS — I50.20 HEART FAILURE WITH REDUCED EJECTION FRACTION (MULTI): ICD-10-CM

## 2024-02-21 DIAGNOSIS — R42 DIZZINESS AND GIDDINESS: ICD-10-CM

## 2024-02-21 DIAGNOSIS — N18.31 STAGE 3A CHRONIC KIDNEY DISEASE (MULTI): ICD-10-CM

## 2024-02-21 DIAGNOSIS — J45.40 MODERATE PERSISTENT ASTHMA WITHOUT COMPLICATION (HHS-HCC): ICD-10-CM

## 2024-02-21 DIAGNOSIS — R42 VERTIGO: ICD-10-CM

## 2024-02-21 DIAGNOSIS — E66.09 CLASS 1 OBESITY DUE TO EXCESS CALORIES WITH SERIOUS COMORBIDITY AND BODY MASS INDEX (BMI) OF 34.0 TO 34.9 IN ADULT: ICD-10-CM

## 2024-02-21 DIAGNOSIS — M26.629 TEMPOROMANDIBULAR JOINT-PAIN-DYSFUNCTION SYNDROME: ICD-10-CM

## 2024-02-21 DIAGNOSIS — M25.562 CHRONIC PAIN OF BOTH KNEES: ICD-10-CM

## 2024-02-21 DIAGNOSIS — R49.0 CHRONIC HOARSENESS: ICD-10-CM

## 2024-02-21 PROCEDURE — 1159F MED LIST DOCD IN RCRD: CPT | Performed by: INTERNAL MEDICINE

## 2024-02-21 PROCEDURE — 3074F SYST BP LT 130 MM HG: CPT | Performed by: INTERNAL MEDICINE

## 2024-02-21 PROCEDURE — 1157F ADVNC CARE PLAN IN RCRD: CPT | Performed by: INTERNAL MEDICINE

## 2024-02-21 PROCEDURE — 99213 OFFICE O/P EST LOW 20 MIN: CPT | Performed by: INTERNAL MEDICINE

## 2024-02-21 PROCEDURE — 3078F DIAST BP <80 MM HG: CPT | Performed by: INTERNAL MEDICINE

## 2024-02-21 PROCEDURE — 1036F TOBACCO NON-USER: CPT | Performed by: INTERNAL MEDICINE

## 2024-02-21 PROCEDURE — 3008F BODY MASS INDEX DOCD: CPT | Performed by: INTERNAL MEDICINE

## 2024-02-21 PROCEDURE — 1126F AMNT PAIN NOTED NONE PRSNT: CPT | Performed by: INTERNAL MEDICINE

## 2024-02-21 RX ORDER — MECLIZINE HCL 12.5 MG 12.5 MG/1
TABLET ORAL
Qty: 30 TABLET | Refills: 1 | Status: SHIPPED | OUTPATIENT
Start: 2024-02-21 | End: 2024-04-08 | Stop reason: SDUPTHER

## 2024-02-21 RX ORDER — FLUTICASONE FUROATE, UMECLIDINIUM BROMIDE AND VILANTEROL TRIFENATATE 100; 62.5; 25 UG/1; UG/1; UG/1
1 POWDER RESPIRATORY (INHALATION) DAILY
Qty: 3 EACH | Refills: 2 | Status: SHIPPED | OUTPATIENT
Start: 2024-02-21

## 2024-02-21 RX ORDER — TIZANIDINE 4 MG/1
6 TABLET ORAL NIGHTLY
Qty: 45 TABLET | Refills: 2 | Status: SHIPPED | OUTPATIENT
Start: 2024-02-21 | End: 2024-03-19

## 2024-02-21 RX ORDER — FLUTICASONE FUROATE, UMECLIDINIUM BROMIDE AND VILANTEROL TRIFENATATE 100; 62.5; 25 UG/1; UG/1; UG/1
1 POWDER RESPIRATORY (INHALATION) DAILY
COMMUNITY
End: 2024-02-21 | Stop reason: SDUPTHER

## 2024-02-21 RX ORDER — DULOXETIN HYDROCHLORIDE 60 MG/1
120 CAPSULE, DELAYED RELEASE ORAL NIGHTLY
Qty: 180 CAPSULE | Refills: 1 | Status: SHIPPED | OUTPATIENT
Start: 2024-02-21

## 2024-02-22 ENCOUNTER — OFFICE VISIT (OUTPATIENT)
Dept: HEMATOLOGY/ONCOLOGY | Facility: CLINIC | Age: 69
End: 2024-02-22
Payer: MEDICARE

## 2024-02-22 ENCOUNTER — HOSPITAL ENCOUNTER (OUTPATIENT)
Dept: RADIOLOGY | Facility: CLINIC | Age: 69
Discharge: HOME | End: 2024-02-22
Payer: MEDICARE

## 2024-02-22 ENCOUNTER — TELEPHONE (OUTPATIENT)
Dept: HEMATOLOGY/ONCOLOGY | Facility: CLINIC | Age: 69
End: 2024-02-22

## 2024-02-22 ENCOUNTER — APPOINTMENT (OUTPATIENT)
Dept: RADIOLOGY | Facility: CLINIC | Age: 69
End: 2024-02-22
Payer: MEDICARE

## 2024-02-22 VITALS
SYSTOLIC BLOOD PRESSURE: 130 MMHG | HEART RATE: 76 BPM | TEMPERATURE: 97.5 F | DIASTOLIC BLOOD PRESSURE: 86 MMHG | RESPIRATION RATE: 17 BRPM | BODY MASS INDEX: 35.49 KG/M2 | OXYGEN SATURATION: 95 % | WEIGHT: 207.89 LBS | HEIGHT: 64 IN

## 2024-02-22 DIAGNOSIS — R07.89 OTHER CHEST PAIN: ICD-10-CM

## 2024-02-22 DIAGNOSIS — R09.82 POST-NASAL DRIP: ICD-10-CM

## 2024-02-22 DIAGNOSIS — I15.0 RENOVASCULAR HYPERTENSION: ICD-10-CM

## 2024-02-22 DIAGNOSIS — N18.31 STAGE 3A CHRONIC KIDNEY DISEASE (MULTI): ICD-10-CM

## 2024-02-22 DIAGNOSIS — M26.629 TEMPOROMANDIBULAR JOINT-PAIN-DYSFUNCTION SYNDROME: ICD-10-CM

## 2024-02-22 DIAGNOSIS — E55.9 VITAMIN D DEFICIENCY: ICD-10-CM

## 2024-02-22 DIAGNOSIS — C09.9 RIGHT TONSILLAR SQUAMOUS CELL CARCINOMA (MULTI): ICD-10-CM

## 2024-02-22 DIAGNOSIS — K21.00 GASTROESOPHAGEAL REFLUX DISEASE WITH ESOPHAGITIS WITHOUT HEMORRHAGE: ICD-10-CM

## 2024-02-22 DIAGNOSIS — E78.5 DYSLIPIDEMIA: ICD-10-CM

## 2024-02-22 DIAGNOSIS — R42 VERTIGO: ICD-10-CM

## 2024-02-22 DIAGNOSIS — M15.9 PRIMARY OSTEOARTHRITIS INVOLVING MULTIPLE JOINTS: ICD-10-CM

## 2024-02-22 DIAGNOSIS — C09.9 RIGHT TONSILLAR SQUAMOUS CELL CARCINOMA (MULTI): Primary | ICD-10-CM

## 2024-02-22 DIAGNOSIS — I21.4 NON-ST ELEVATION MYOCARDIAL INFARCTION (NSTEMI), INITIAL CARE EPISODE (MULTI): ICD-10-CM

## 2024-02-22 PROCEDURE — 76536 US EXAM OF HEAD AND NECK: CPT

## 2024-02-22 PROCEDURE — 3008F BODY MASS INDEX DOCD: CPT | Performed by: PHYSICIAN ASSISTANT

## 2024-02-22 PROCEDURE — 1157F ADVNC CARE PLAN IN RCRD: CPT | Performed by: PHYSICIAN ASSISTANT

## 2024-02-22 PROCEDURE — 99215 OFFICE O/P EST HI 40 MIN: CPT | Performed by: PHYSICIAN ASSISTANT

## 2024-02-22 PROCEDURE — 3079F DIAST BP 80-89 MM HG: CPT | Performed by: PHYSICIAN ASSISTANT

## 2024-02-22 PROCEDURE — 76536 US EXAM OF HEAD AND NECK: CPT | Performed by: RADIOLOGY

## 2024-02-22 PROCEDURE — 1036F TOBACCO NON-USER: CPT | Performed by: PHYSICIAN ASSISTANT

## 2024-02-22 PROCEDURE — 1159F MED LIST DOCD IN RCRD: CPT | Performed by: PHYSICIAN ASSISTANT

## 2024-02-22 PROCEDURE — 3075F SYST BP GE 130 - 139MM HG: CPT | Performed by: PHYSICIAN ASSISTANT

## 2024-02-22 PROCEDURE — 1125F AMNT PAIN NOTED PAIN PRSNT: CPT | Performed by: PHYSICIAN ASSISTANT

## 2024-02-22 ASSESSMENT — PAIN SCALES - GENERAL: PAINLEVEL: 9

## 2024-02-22 NOTE — PROGRESS NOTES
Reason for Visit  Dilma John is a 68 y.o. 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 Ruff for concern for recurrence.    PMH/PSH: Migraine HA, asthma, HTN, MI, CKD stage III, 2 knee replacements, herniated disc, factor VII deficiency, fibroids s/p hysterectomy, appendectomy  FH: Denies FH of cancers, bleeding or clotting disorder.  Soc Hx: Denies smoking, ETOH, illicit drugs; Special , Single, 1 daughter.    History of Present Illness:  Patient initially found to have a right tonsillar lesion in May 2012 when she presented with discomfort and pain on swallowing.  CT scan of the neck  showed soft tissue asymmetry involving the right tonsillar  fossa with an exophytic component in the right aspect of the  oropharynx as well as involvement of the uvula. Panendoscopy with biopsy of the tonsil region which was positive for squamous cell carcinoma, positive for p16 and p63 staining.  The patient also had a PET-CT scan which  showed intensely hypermetabolic right palatine tonsil consistent with  malignancy with a focus of moderate FDG uptake within the right  aspect of the neck, possibly cervical lymph node.  The patient  treated with concurrent radiation and weekly cetuximab. Course complicated by kidney failure and nerve damage in her face.     On assessment, patient notes having similar symptoms from when initially diagnosed of hoarseness, coughing/vomiting up blood (usually happens with nose bleeds but now happening w/o it) since summer of 2023. Patient recently saw ENT, nasal endoscopy and laryngoscopy, unremarkable. Being referred to another ENT for better visualization. Last CT was a PE protocol done in 1/2022, Enlarged left lobe of the thyroid where with heterogeneity and substernal extension. There is tracheal deviation to the right. This could represent a  unilateral mass or goiter. This represents a change compared prior chest CT dated 12/13/2013. Patient seeing ortho for chronic back pain. Otherwise doing well. Denies B symptoms, n/v/d/abd pain, SOB, CP, melena, neuropathy or any other complaints at this time.    Review of Systems: All of the systems have been reviewed and are negative for complaints except what is stated in the HPI and/or past medical history.    Allergies   Allergen Reactions    Buspirone Unknown    Cefazolin Swelling    Clindamycin Swelling, Hives and Itching    Donepezil Unknown    Erythromycin Hives    Ibuprofen Other     Raises Blood Pressure    Latex Itching, Hives and Swelling     breaks out    Levofloxacin Swelling     inflamed bowel    Nsaids (Non-Steroidal Anti-Inflammatory Drug) Unknown    Sulfa (Sulfonamide Antibiotics) Hives    Benadryl Allergy Decongestant Palpitations     Rapid heart rate    Penicillins Rash and Hives              Outpatient Medication Profile:  Current Outpatient Medications   Medication Sig Dispense Refill    albuterol 2.5 mg /3 mL (0.083 %) nebulizer solution Inhale 3 mL (2.5 mg) every 6 hours if needed.      albuterol 90 mcg/actuation inhaler Inhale 2 puffs every 6 hours if needed for shortness of breath.      allopurinol (Zyloprim) 100 mg tablet TAKE 1 TABLET BY MOUTH EVERY DAY 90 tablet 2    aluminum hydrox-magnesium carb (Gaviscon Extra Strength) 160-105 mg tablet,chewable Chew 1 tablet once daily at bedtime. 60 tablet 0    azelastine (Astelin) 137 mcg (0.1 %) nasal spray Administer 2 sprays into affected nostril(s) 2 times a day.      benzonatate (Tessalon) 200 mg capsule Take 1 capsule (200 mg) by mouth 3 times a day as needed for cough.      bumetanide (Bumex) 0.5 mg tablet Take 1 tablet (0.5 mg) by mouth once daily. 90 tablet 3    busPIRone (Buspar) 15 mg tablet TAKE 1/2 TABLET BY MOUTH TWICE A DAY 90 tablet 1    butalbital-acetaminophen-caff -40 mg tablet TAKE 1 TABLET BY MOUTH AT ONSET OF HEADACHE  THEN UP TO EVERY 6 HRS AS NEEDED. MAX 3 TABS PER DAY 20 tablet 3    cetirizine (ZyrTEC) 10 mg tablet Take 1 tablet (10 mg) by mouth once daily as needed for allergies. 30 tablet 2    cholecalciferol (Vitamin D-3) 50 mcg (2,000 unit) capsule Take 1 capsule (50 mcg) by mouth once daily.      cloNIDine (Catapres) 0.1 mg tablet Take 1 tablet (0.1 mg) by mouth 3 times a day. 270 tablet 3    diclofenac sodium 1.5 % drops Apply 4 drops topically 4 times a day as needed.      DULoxetine (Cymbalta) 60 mg DR capsule Take 2 capsules (120 mg) by mouth once daily at bedtime. 180 capsule 1    erythromycin base (E-Mycin) 500 mg tablet Take 2 tablets (1,000 mg) by mouth. ONE HOUR BEFORE THE DENTAL PROCEDURE      fluticasone (Flonase) 50 mcg/actuation nasal spray Administer 1 spray into affected nostril(s) 2 times a day.      fluticasone-umeclidin-vilanter (Trelegy Ellipta) 100-62.5-25 mcg blister with device Inhale 1 puff early in the morning.. 3 each 2    gabapentin (Neurontin) 600 mg tablet Take 2 tablets (1,200 mg) by mouth 3 times a day.      hydrALAZINE (Apresoline) 25 mg tablet Take 1 tablet (25 mg) by mouth 3 times a day. 270 tablet 3    hydrOXYzine pamoate (Vistaril) 25 mg capsule TAKE 1 CAPSULE (25 MG) BY MOUTH 3 TIMES A DAY AS NEEDED FOR ANXIETY. 30 capsule 1    isosorbide mononitrate ER (Imdur) 60 mg 24 hr tablet Take 1 tablet (60 mg) by mouth once daily. 90 tablet 3    meclizine (Antivert) 12.5 mg tablet TAKE 1 TABLET BY MOUTH THREE TIMES A DAY AS NEEDED 30 tablet 1    metoclopramide (Reglan) 10 mg tablet Take 1 tablet (10 mg) by mouth twice a day.      mometasone (Nasonex) 50 mcg/actuation nasal spray Administer 2 sprays into each nostril 2 times a day. 17 g 2    multivitamin (Multiple Vitamins) tablet Take 1 tablet by mouth once daily.      neomycin-polymyxin-HC (Cortisporin) otic solution Administer 3 drops into each ear. 3-4 TIMES DAILY.      nitroglycerin (Nitrostat) 0.3 mg SL tablet Place 1 tablet (0.3 mg) under  "the tongue every 5 minutes if needed for chest pain (3 DOSES. IF CHEST PAIN CONTINUES, CALL 911).      olopatadine (Pataday) 0.2 % ophthalmic solution Administer 1 drop into affected eye(s) once daily as needed for allergies (itchy eyes). 2.5 mL 2    ondansetron (Zofran) 4 mg tablet Take 1 tablet (4 mg) by mouth every 8 hours if needed for nausea.      pantoprazole (ProtoNix) 20 mg EC tablet TAKE 1 TABLET BY MOUTH EVERY DAY 90 tablet 3    tiZANidine (Zanaflex) 4 mg tablet Take 1.5 tablets (6 mg) by mouth once daily at bedtime. 45 tablet 2    verapamil (Calan) 120 mg tablet Take 1 tablet (120 mg) by mouth 3 times a day. 270 tablet 3     No current facility-administered medications for this visit.        Vitals and Measurements:   Visit Vitals  /86 (BP Location: Left arm)   Pulse 76   Temp 36.4 °C (97.5 °F)   Resp 17        Physical Exam:   Constitutional: alert, awake and oriented, not in acute distress   HEENT: moist mucous membranes, normal nose   Neck: supple, no lymphadenopathy   EYES: PERRL, EOM intact, conjunctiva normal  Skin: no jaundice, rash or erythema  Neurological: AAOx3, no gross focal deficit   Psychiatric: normal mood and behavior     Lab Results   Component Value Date    WBC 4.7 02/01/2024    NEUTROABS 2.50 02/01/2024    IGABSOL 0.01 02/01/2024    LYMPHSABS 1.34 02/01/2024    MONOSABS 0.47 02/01/2024    EOSABS 0.27 02/01/2024    BASOSABS 0.06 02/01/2024    RBC 4.84 02/01/2024    MCV 85 02/01/2024    MCHC 30.9 (L) 02/01/2024    HGB 12.7 02/01/2024    HCT 41.1 02/01/2024     02/01/2024     No results found for: \"RETICCTPCT\"   Lab Results   Component Value Date    CREATININE 2.01 (H) 02/01/2024    BUN 30 (H) 02/01/2024    EGFR 27 (L) 02/01/2024     (H) 02/01/2024    K 4.6 02/01/2024     (H) 02/01/2024    CO2 28 02/01/2024      Lab Results   Component Value Date    ALT 9 02/01/2024    AST 13 02/01/2024    ALKPHOS 68 02/01/2024    BILITOT 0.3 02/01/2024      Lab Results " "  Component Value Date    TSH 7.06 (H) 02/01/2024     Lab Results   Component Value Date    TSH 7.06 (H) 02/01/2024     Lab Results   Component Value Date    UYAMVOUN75 1,107 (H) 02/01/2024      No results found for: \"FOLATE\"  Lab Results   Component Value Date    CATIA NEGATIVE 11/03/2020    RF 11 11/03/2020    SEDRATE 14 11/07/2023      Lab Results   Component Value Date    CRP <0.10 11/07/2023     Lab Results   Component Value Date    SPEP Normal. 02/01/2024     Assessment:    68 y.o. 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    Symptomatic with hoarseness, coughing/vomiting up blood     Plan:    Reviewed and discussed lab, imaging, and pathology results with patient in detail as well as diagnosis, prognosis, and treatment options.    Discussed obtaining imaging, has CKD, therefore will obtain PET/CT.    Will obtain thyroid US    F/U w/new ENT    F/U w/PCP    RTC after PET/CT    Patient verbalized understanding, and all her questions were answered to her satisfaction    60 min spent with patient greater than 50 % of which was spent in consultation and coordination of care.    "

## 2024-02-23 ENCOUNTER — LAB (OUTPATIENT)
Dept: LAB | Facility: LAB | Age: 69
End: 2024-02-23
Payer: MEDICARE

## 2024-02-23 ENCOUNTER — HOSPITAL ENCOUNTER (OUTPATIENT)
Dept: RADIOLOGY | Facility: CLINIC | Age: 69
Discharge: HOME | End: 2024-02-23
Payer: MEDICARE

## 2024-02-23 ENCOUNTER — OFFICE VISIT (OUTPATIENT)
Dept: ORTHOPEDIC SURGERY | Facility: CLINIC | Age: 69
End: 2024-02-23
Payer: MEDICARE

## 2024-02-23 VITALS — HEIGHT: 64 IN | WEIGHT: 208 LBS | BODY MASS INDEX: 35.51 KG/M2

## 2024-02-23 DIAGNOSIS — Z96.652 HISTORY OF TOTAL KNEE ARTHROPLASTY, LEFT: ICD-10-CM

## 2024-02-23 DIAGNOSIS — Z96.652 HISTORY OF TOTAL KNEE ARTHROPLASTY, LEFT: Primary | ICD-10-CM

## 2024-02-23 DIAGNOSIS — G89.29 CHRONIC PAIN OF RIGHT KNEE: ICD-10-CM

## 2024-02-23 DIAGNOSIS — M25.561 CHRONIC PAIN OF RIGHT KNEE: ICD-10-CM

## 2024-02-23 DIAGNOSIS — Z96.651 HISTORY OF TOTAL KNEE ARTHROPLASTY, RIGHT: ICD-10-CM

## 2024-02-23 DIAGNOSIS — M25.561 CHRONIC PAIN OF BOTH KNEES: ICD-10-CM

## 2024-02-23 DIAGNOSIS — G89.29 CHRONIC PAIN OF BOTH KNEES: ICD-10-CM

## 2024-02-23 DIAGNOSIS — M25.562 CHRONIC PAIN OF BOTH KNEES: ICD-10-CM

## 2024-02-23 LAB
CRP SERPL-MCNC: <0.1 MG/DL
ERYTHROCYTE [SEDIMENTATION RATE] IN BLOOD BY WESTERGREN METHOD: 14 MM/H (ref 0–30)

## 2024-02-23 PROCEDURE — 1159F MED LIST DOCD IN RCRD: CPT | Performed by: STUDENT IN AN ORGANIZED HEALTH CARE EDUCATION/TRAINING PROGRAM

## 2024-02-23 PROCEDURE — 1157F ADVNC CARE PLAN IN RCRD: CPT | Performed by: STUDENT IN AN ORGANIZED HEALTH CARE EDUCATION/TRAINING PROGRAM

## 2024-02-23 PROCEDURE — G2212 PROLONG OUTPT/OFFICE VIS: HCPCS | Performed by: STUDENT IN AN ORGANIZED HEALTH CARE EDUCATION/TRAINING PROGRAM

## 2024-02-23 PROCEDURE — 1125F AMNT PAIN NOTED PAIN PRSNT: CPT | Performed by: STUDENT IN AN ORGANIZED HEALTH CARE EDUCATION/TRAINING PROGRAM

## 2024-02-23 PROCEDURE — 99417 PROLNG OP E/M EACH 15 MIN: CPT | Performed by: STUDENT IN AN ORGANIZED HEALTH CARE EDUCATION/TRAINING PROGRAM

## 2024-02-23 PROCEDURE — 86140 C-REACTIVE PROTEIN: CPT

## 2024-02-23 PROCEDURE — 73562 X-RAY EXAM OF KNEE 3: CPT | Mod: LT

## 2024-02-23 PROCEDURE — 1036F TOBACCO NON-USER: CPT | Performed by: STUDENT IN AN ORGANIZED HEALTH CARE EDUCATION/TRAINING PROGRAM

## 2024-02-23 PROCEDURE — 73562 X-RAY EXAM OF KNEE 3: CPT | Mod: RT

## 2024-02-23 PROCEDURE — 36415 COLL VENOUS BLD VENIPUNCTURE: CPT

## 2024-02-23 PROCEDURE — 3008F BODY MASS INDEX DOCD: CPT | Performed by: STUDENT IN AN ORGANIZED HEALTH CARE EDUCATION/TRAINING PROGRAM

## 2024-02-23 PROCEDURE — 73562 X-RAY EXAM OF KNEE 3: CPT | Mod: RIGHT SIDE | Performed by: RADIOLOGY

## 2024-02-23 PROCEDURE — 99215 OFFICE O/P EST HI 40 MIN: CPT | Performed by: STUDENT IN AN ORGANIZED HEALTH CARE EDUCATION/TRAINING PROGRAM

## 2024-02-23 PROCEDURE — 85652 RBC SED RATE AUTOMATED: CPT

## 2024-02-23 ASSESSMENT — PAIN - FUNCTIONAL ASSESSMENT: PAIN_FUNCTIONAL_ASSESSMENT: 0-10

## 2024-02-23 ASSESSMENT — PAIN SCALES - GENERAL: PAINLEVEL_OUTOF10: 10 - WORST POSSIBLE PAIN

## 2024-02-23 ASSESSMENT — PAIN DESCRIPTION - DESCRIPTORS: DESCRIPTORS: ACHING

## 2024-02-23 NOTE — PROGRESS NOTES
TUTU/TKA Related Summary           L hip: N  L knee  2/17/2023: Primary TKA (Dr. Hector Jaime at )  R hip: N  R knee  3/7/2022: Primary TKA (Dr. Hector Jaime at )  Lumbar pathology: N            CC/SUBJECTIVE/HPI            PCP: Aaron Ruff MD  Referring provider: Aaron Ruff MD  Occupation: Retired  Hobbies: Traveling, crafts, bike riding  Dilma John is a 68 y.o. female presenting for follow-up of her bilateral primary TKA's with my now retired partner, Dr. Jaime.    L knee  Dilma had her index surgery in 2/2023.  She never had a pain-free window following surgery and reports that the pain has simply gotten worse over time.  It keeps her up at night but is worse with weightbearing.  Symptoms  Pain: 9/10  Onset: chronic/gradual  Duration: 1yr  Location: all over  Quality: sharp/stab and radiate  Limitations: morning stiffness, feeling unstable, night pain, difficulty with stairs, and difficulty in/out of chair/car  Ambulation limit due to pain: unlimited but hurts during  Other symptoms: none  Treatment  Tried: activity modification, ice/heat, and OTCs  Most recent injection: none  Assistive device: cane  Treatment attempted for 1yr and is partially effective    R knee  Her index TKA was in 3/2022.  Similar to the left side, she never had any pain-free window following surgery.  The pain is simply gotten worse with time this knee also keeps her up at night and is worse with weightbearing.   Pain: 9/10  Onset: chronic/gradual  Duration: 1-2yrs  Location: inner knee  Quality: sharp/stab and radiate  Limitations: morning stiffness, feeling unstable, night pain, difficulty with stairs, and difficulty in/out of chair/car  Ambulation limit due to pain: unlimited but hurts later  Other symptoms: none  Treatment  Tried: activity modification, ice/heat, and OTCs  Most recent injection: none  Assistive device: cane  Treatment attempted for 1-2yrs and is partially effective            HISTORIES (System Generated  and Pt Reported on Form Today)       Dental  Pt reported: edentulous     PMH  Pt reported: Denies heart/lung/kidney/liver issues, DM, stroke, seizure, bariatric surgery, anticoag, MRSA, cancer, personal/familial coagulopathies except: cancer (Type: throat. Status: In remission. Ever metastatic: N)  System generated (PMH, problem list both included since EMR change caused discrepancies):   Past Medical History:   Diagnosis Date    Chronic sinusitis, unspecified 04/26/2021    Sinusitis    Essential (primary) hypertension 07/05/2022    Benign essential hypertension    Personal history of other diseases of the nervous system and sense organs 12/30/2021    History of migraine headaches     Patient Active Problem List   Diagnosis    Vertigo    Acute recurrent frontal sinusitis    Allergic rhinitis    Chronic laryngitis    Anxiety disorder    Asthma    Astigmatism, bilateral    Atrophy of quadriceps femoris muscle    Bee sting reaction    Bilateral knee pain    Chest pain    Chronic hoarseness    Chronic migraine without aura without status migrainosus, not intractable    Chronic otitis media of right ear    BRYAN (dyspnea on exertion)    Dyslipidemia    Gastroesophageal reflux disease with esophagitis    Heart failure with reduced ejection fraction (CMS/HCC)    History of malignant neoplasm of larynx    History of throat cancer    Hypertension    Non-ST elevation myocardial infarction (NSTEMI), initial care episode (CMS/HCC)    Obstructive sleep apnea    Epistaxis, recurrent    Stage 3 chronic kidney disease (CMS/HCC)    Bilateral myopia    Candidal intertrigo    Cervicocranial syndrome    Effusion of left knee    Primary osteoarthritis of left knee    Foraminal stenosis of lumbar region    Lumbar radiculopathy, acute    Bilateral occipital neuralgia    Lumbar neuritis    Odynophagia    Osteoarthritis    Otitis externa    Pain of right thumb    Proteinuria    Quadriceps weakness    Referred otalgia of right ear     Sacroiliac joint pain    Salivary secretion disturbance    Sebaceous cyst    Spasm of back muscles    Stiffness of left knee, not elsewhere classified    Temporomandibular joint-pain-dysfunction syndrome    Weight loss, unintentional    Abdominal pain    Aftercare following bilateral knee joint replacement surgery    Class 1 obesity with body mass index (BMI) of 30.0 to 30.9 in adult    Class 1 obesity with body mass index (BMI) of 34.0 to 34.9 in adult    Diarrhea    Difficulty walking due to knee joint    Earache    Edema of knee    Elevated serum creatinine    Gastritis    Hematuria    History of allergy    Jaw pain, non-TMJ    Leg pain, lateral    Low back pain    Myositis    Nausea in adult    Post-nasal drip    Pyuria     PSH  Pt reported: Per above.   System generated:   Past Surgical History:   Procedure Laterality Date    MR HEAD ANGIO WO IV CONTRAST  6/13/2013    MR HEAD ANGIO WO IV CONTRAST 6/13/2013 [facility]    OTHER SURGICAL HISTORY  04/17/2019    Hysterectomy    OTHER SURGICAL HISTORY  04/17/2019    Uterine myomectomy    OTHER SURGICAL HISTORY  04/17/2019    Tonsillectomy     Family Hx  Pt reported clot/coagulopathies: none  System generated:   Family History   Problem Relation Name Age of Onset    Arthritis Mother      Asthma Other      Diabetes Other      Hypertension Other      Heart attack Other      Stroke Other       Social Hx  Pt reported substance use: N  System generated:   Social History     Tobacco Use    Smoking status: Never    Smokeless tobacco: Never   Substance Use Topics    Alcohol use: Not Currently    Drug use: Never     Allergies  Pt reported (meds, metals): per below  System generated:   Allergies   Allergen Reactions    Buspirone Unknown    Cefazolin Swelling    Clindamycin Swelling, Hives and Itching    Donepezil Unknown    Erythromycin Hives    Ibuprofen Other     Raises Blood Pressure    Latex Itching, Hives and Swelling     breaks out    Levofloxacin Swelling      inflamed bowel    Nsaids (Non-Steroidal Anti-Inflammatory Drug) Unknown    Sulfa (Sulfonamide Antibiotics) Hives    Benadryl Allergy Decongestant Palpitations     Rapid heart rate    Penicillins Rash and Hives     Current Meds  System generated:   Current Outpatient Medications:     albuterol 2.5 mg /3 mL (0.083 %) nebulizer solution, Inhale 3 mL (2.5 mg) every 6 hours if needed., Disp: , Rfl:     albuterol 90 mcg/actuation inhaler, Inhale 2 puffs every 6 hours if needed for shortness of breath., Disp: , Rfl:     allopurinol (Zyloprim) 100 mg tablet, TAKE 1 TABLET BY MOUTH EVERY DAY, Disp: 90 tablet, Rfl: 2    aluminum hydrox-magnesium carb (Gaviscon Extra Strength) 160-105 mg tablet,chewable, Chew 1 tablet once daily at bedtime., Disp: 60 tablet, Rfl: 0    azelastine (Astelin) 137 mcg (0.1 %) nasal spray, Administer 2 sprays into affected nostril(s) 2 times a day., Disp: , Rfl:     benzonatate (Tessalon) 200 mg capsule, Take 1 capsule (200 mg) by mouth 3 times a day as needed for cough., Disp: , Rfl:     bumetanide (Bumex) 0.5 mg tablet, Take 1 tablet (0.5 mg) by mouth once daily., Disp: 90 tablet, Rfl: 3    busPIRone (Buspar) 15 mg tablet, TAKE 1/2 TABLET BY MOUTH TWICE A DAY, Disp: 90 tablet, Rfl: 1    butalbital-acetaminophen-caff -40 mg tablet, TAKE 1 TABLET BY MOUTH AT ONSET OF HEADACHE THEN UP TO EVERY 6 HRS AS NEEDED. MAX 3 TABS PER DAY, Disp: 20 tablet, Rfl: 3    cetirizine (ZyrTEC) 10 mg tablet, Take 1 tablet (10 mg) by mouth once daily as needed for allergies., Disp: 30 tablet, Rfl: 2    cholecalciferol (Vitamin D-3) 50 mcg (2,000 unit) capsule, Take 1 capsule (50 mcg) by mouth once daily., Disp: , Rfl:     cloNIDine (Catapres) 0.1 mg tablet, Take 1 tablet (0.1 mg) by mouth 3 times a day., Disp: 270 tablet, Rfl: 3    diclofenac sodium 1.5 % drops, Apply 4 drops topically 4 times a day as needed., Disp: , Rfl:     DULoxetine (Cymbalta) 60 mg DR capsule, Take 2 capsules (120 mg) by mouth once daily at  bedtime., Disp: 180 capsule, Rfl: 1    erythromycin base (E-Mycin) 500 mg tablet, Take 2 tablets (1,000 mg) by mouth. ONE HOUR BEFORE THE DENTAL PROCEDURE, Disp: , Rfl:     fluticasone (Flonase) 50 mcg/actuation nasal spray, Administer 1 spray into affected nostril(s) 2 times a day., Disp: , Rfl:     fluticasone-umeclidin-vilanter (Trelegy Ellipta) 100-62.5-25 mcg blister with device, Inhale 1 puff early in the morning.., Disp: 3 each, Rfl: 2    gabapentin (Neurontin) 600 mg tablet, Take 2 tablets (1,200 mg) by mouth 3 times a day., Disp: , Rfl:     hydrALAZINE (Apresoline) 25 mg tablet, Take 1 tablet (25 mg) by mouth 3 times a day., Disp: 270 tablet, Rfl: 3    hydrOXYzine pamoate (Vistaril) 25 mg capsule, TAKE 1 CAPSULE (25 MG) BY MOUTH 3 TIMES A DAY AS NEEDED FOR ANXIETY., Disp: 30 capsule, Rfl: 1    isosorbide mononitrate ER (Imdur) 60 mg 24 hr tablet, Take 1 tablet (60 mg) by mouth once daily., Disp: 90 tablet, Rfl: 3    meclizine (Antivert) 12.5 mg tablet, TAKE 1 TABLET BY MOUTH THREE TIMES A DAY AS NEEDED, Disp: 30 tablet, Rfl: 1    metoclopramide (Reglan) 10 mg tablet, Take 1 tablet (10 mg) by mouth twice a day., Disp: , Rfl:     mometasone (Nasonex) 50 mcg/actuation nasal spray, Administer 2 sprays into each nostril 2 times a day., Disp: 17 g, Rfl: 2    multivitamin (Multiple Vitamins) tablet, Take 1 tablet by mouth once daily., Disp: , Rfl:     neomycin-polymyxin-HC (Cortisporin) otic solution, Administer 3 drops into each ear. 3-4 TIMES DAILY., Disp: , Rfl:     nitroglycerin (Nitrostat) 0.3 mg SL tablet, Place 1 tablet (0.3 mg) under the tongue every 5 minutes if needed for chest pain (3 DOSES. IF CHEST PAIN CONTINUES, CALL 911)., Disp: , Rfl:     olopatadine (Pataday) 0.2 % ophthalmic solution, Administer 1 drop into affected eye(s) once daily as needed for allergies (itchy eyes)., Disp: 2.5 mL, Rfl: 2    ondansetron (Zofran) 4 mg tablet, Take 1 tablet (4 mg) by mouth every 8 hours if needed for nausea.,  "Disp: , Rfl:     pantoprazole (ProtoNix) 20 mg EC tablet, TAKE 1 TABLET BY MOUTH EVERY DAY, Disp: 90 tablet, Rfl: 3    tiZANidine (Zanaflex) 4 mg tablet, Take 1.5 tablets (6 mg) by mouth once daily at bedtime., Disp: 45 tablet, Rfl: 2    verapamil (Calan) 120 mg tablet, Take 1 tablet (120 mg) by mouth 3 times a day., Disp: 270 tablet, Rfl: 3    ROS: Neg except HPI            OBJECTIVE            Physical exam  Estimated body mass index is 35.89 kg/m² as calculated from the following:    Height as of 2/22/24: 1.621 m (5' 3.82\").    Weight as of 2/22/24: 94.3 kg (207 lb 14.3 oz).  Gen/psych: NAD, conversational, appropriate    Ambulation  Gait: antalgic  Assistive device: none  Spine  Lumbar tenderness: neg  Limited ROM: neg, with no radiation or pain with flex/ex, lateral bending  SLR test: neg    Focused MSK exam: L  TKA  Thrust: neg  Skin/Incision: well healed.   Effusion: mild  Alignment: neutral  Alignment correctable: na  Knee tenderness: diffuse  Extension: passive flexion contracture 0-5° and active extension lag 0°  Flexion: 110º  Flexion stability: stable  Varus/Valgus stability: ~1cm laxity laterally  Referred pain to knee with hip 90° flexion and 45° ER/IR: neg  Neurovascular    Strength: 4+/5 hip/knee/ankle flexion and extension  Sensory (L2-S1): SILT throughout lower extremity  Edema/stasis: no pitting edema  Pulse: DP 1+, PT 1+     Focused MSK exam: R  TKA  Thrust: neg  Skin/Incision: well healed.   Effusion: mild  Alignment: neutral  Alignment correctable: na  Knee tenderness: medial joint line and posterior  Extension: passive flexion contracture 0-5° and active extension lag 0°  Flexion: 110º  Flexion stability: stable  Varus/Valgus stability: ~0.5cm laxity medially  Referred pain to knee with hip 90° flexion and 45° ER/IR: neg  Neurovascular    Strength: 4+/5 hip/knee/ankle flexion and extension  Sensory (L2-S1): SILT throughout lower extremity  Edema/stasis: no pitting edema  Pulse: DP 1+, PT " 1+    Imaging  2/23/2024 L knee radiographs ([facility], WB AP/lateral) on my read: TKA components in acceptable position with no sign of gross implant/hardware failure.    2/23/2024 R knee radiographs ([facility], WB AP/lateral) on my read: TKA components in acceptable position with no sign of gross implant/hardware failure.             ASSESSMENT & PLAN           Patient verbalized understanding of below A&P. All questions answered.  #1: Bilateral painful TKA  Unfortunately, it is not clear what has led to the sequence of having only a very brief if any pain painless window following surgery then progressively worsening pain since.  Differential dx  Possibly consistent with today's H&P  PJI: Would be an unusual presentation given bilateral nature and worsening pain ever since surgery  Instability: Due to ligamentous laxity or poly wear: Mild to moderate laxity in extension bilaterally (medial on right, lateral on left).  I do not detect any flexion instability and the extension instability is not symmetric medially and laterally, so a simple solution in the form of upsizing the polyethylene would likely not work well.  Loosening: No gross signs of loosening on radiographs today, but this is certainly a possibility.  Unfortunately, a certain percentage of patients experience chronic pain for unknown reasons following arthroplasty. Although it is an active area of research, we do not currently have an effective way to help these patients.  Not consistent with today's H&P  Hip pathology (no referred pain to knee on exam), Spine pathology (no knee pain exacerbation with lumbar flex/ex, side bends), Vascular dz (no claudication), Periarticular bursitis/tendonitis, Fibromyalgia, Extensor mechanism and/or patellar pathology (painless, 4+/5 SLR), Component malposition (grossly appropriate on radiographs, would require CT for detailed evaluation if warranted), arthrofibrosis (reasonable ROM).  Treatment options  Conservative  options should be maximized and include activity modification, bracing, weight loss, PT, home exercise, local pain control (ice, heat, topical medications), OTCs, and assistive devices. Once these have been exhausted, revision surgery may be considered, but only if there is a specific problem the surgery aims to correct.  Shared decision making  PJI: ESR, CRP  2/23/2024 addendum: Off abx >2wks and using chronic PJI criteria (serum CRP >10mg/L (pt <1mg/L), serum ESR >30mm/hr (pt 14mm/hr)), patient has ~95% chance of not being infected.  Instability: 8-week course of physical therapy, focusing on aquatic therapy.  She has a large habitus and is fairly deconditioned on exam.  Loosening: If the PJI screening is negative and aquatic therapy does not help her pain, we may consider a bone scan.  She will call the office if this is the case, and we can order this test.  She does not need to come back in for an appointment.  PMH, PSH, other considerations discussed  Would require clearance: HFrEF, NSTEMI, OMAYRA  CKD (most recent Cr 2.0 on 2/1/2024) can be associated with increased risk of surgical complications including transfusion, length of stay, and even death.  Although not >40, current BMI is on spectrum of increased risk of surgical complications.  Not evident on exam today: Lumbar radiculopathy, foraminal stenosis, myositis, lower back pain  Sulfa and minor penicillin allergies  Hx bariatric surgery (albumin 4.1 on 2/6/2024)  Follow-up: As needed if PJI screening negative and physical therapy helps.  Otherwise, assuming PJI screening is negative, she will call the office if she is not feeling well after physical therapy.    Time: I spent 90min preparing to see pt, obtaining/reviewing separately obtained hx, performing necessary/appropriate PE/eval, independently interpreting results and communicating them with counseling/education to pt/fam/caregiver, placing orders, communicating/coordinating care with other  providers, and documenting in EMR.

## 2024-02-26 DIAGNOSIS — N18.4 CHRONIC KIDNEY DISEASE, STAGE 4 (SEVERE) (MULTI): Primary | ICD-10-CM

## 2024-03-06 DIAGNOSIS — R09.82 POST-NASAL DRIP: ICD-10-CM

## 2024-03-06 DIAGNOSIS — J30.9 ALLERGIC RHINITIS, UNSPECIFIED SEASONALITY, UNSPECIFIED TRIGGER: ICD-10-CM

## 2024-03-06 RX ORDER — CETIRIZINE HYDROCHLORIDE 10 MG/1
10 TABLET ORAL DAILY PRN
Qty: 90 TABLET | Refills: 1 | Status: SHIPPED | OUTPATIENT
Start: 2024-03-06 | End: 2025-03-06

## 2024-03-07 ENCOUNTER — HOSPITAL ENCOUNTER (OUTPATIENT)
Dept: RADIOLOGY | Facility: CLINIC | Age: 69
Discharge: HOME | End: 2024-03-07
Payer: MEDICARE

## 2024-03-07 ENCOUNTER — APPOINTMENT (OUTPATIENT)
Dept: PAIN MEDICINE | Facility: HOSPITAL | Age: 69
End: 2024-03-07
Payer: MEDICARE

## 2024-03-07 DIAGNOSIS — C09.9 RIGHT TONSILLAR SQUAMOUS CELL CARCINOMA (MULTI): ICD-10-CM

## 2024-03-07 PROCEDURE — 78815 PET IMAGE W/CT SKULL-THIGH: CPT | Mod: PET TUMOR SUBSQ TX STRATEGY | Performed by: NUCLEAR MEDICINE

## 2024-03-07 PROCEDURE — 78815 PET IMAGE W/CT SKULL-THIGH: CPT | Mod: PS

## 2024-03-07 PROCEDURE — 3430000001 HC RX 343 DIAGNOSTIC RADIOPHARMACEUTICALS: Performed by: PHYSICIAN ASSISTANT

## 2024-03-07 PROCEDURE — A9552 F18 FDG: HCPCS | Performed by: PHYSICIAN ASSISTANT

## 2024-03-07 RX ORDER — FLUDEOXYGLUCOSE F 18 200 MCI/ML
12.45 INJECTION, SOLUTION INTRAVENOUS
Status: COMPLETED | OUTPATIENT
Start: 2024-03-07 | End: 2024-03-07

## 2024-03-07 RX ORDER — MOMETASONE FUROATE 50 UG/1
2 SPRAY, METERED NASAL 2 TIMES DAILY
Qty: 17 G | Refills: 2 | Status: SHIPPED | OUTPATIENT
Start: 2024-03-07 | End: 2024-05-02

## 2024-03-07 RX ADMIN — FLUDEOXYGLUCOSE F 18 12.45 MILLICURIE: 200 INJECTION, SOLUTION INTRAVENOUS at 11:23

## 2024-03-13 ENCOUNTER — EVALUATION (OUTPATIENT)
Dept: PHYSICAL THERAPY | Facility: CLINIC | Age: 69
End: 2024-03-13
Payer: MEDICARE

## 2024-03-13 DIAGNOSIS — Z96.651 HISTORY OF TOTAL KNEE ARTHROPLASTY, RIGHT: ICD-10-CM

## 2024-03-13 DIAGNOSIS — Z96.652 HISTORY OF TOTAL KNEE ARTHROPLASTY, LEFT: ICD-10-CM

## 2024-03-13 PROCEDURE — 97110 THERAPEUTIC EXERCISES: CPT | Mod: GP

## 2024-03-13 PROCEDURE — 97161 PT EVAL LOW COMPLEX 20 MIN: CPT | Mod: GP

## 2024-03-13 ASSESSMENT — ENCOUNTER SYMPTOMS
OCCASIONAL FEELINGS OF UNSTEADINESS: 1
LOSS OF SENSATION IN FEET: 0
DEPRESSION: 0

## 2024-03-13 ASSESSMENT — PAIN SCALES - GENERAL: PAINLEVEL_OUTOF10: 8

## 2024-03-13 NOTE — PROGRESS NOTES
"  Physical Therapy  Physical Therapy Orthopedic Evaluation    Patient Name: Dilma John  MRN: [id number]  Today's Date: 3/13/2024  Time Calculation  Start Time: 1320  Stop Time: 1405  Time Calculation (min): 45 min    Insurance:  Visit number: 1 of [place]  Authorization info: no auth  Insurance Type: [employer] Advantage  Medicare Certification Period: Beginning: 3/13/24 Endin24  Onset date: 3/1/2022    General:  Reason for visit: Bilateral Knee Pain  Referred by: Luis Ott    Current Problem:  1. History of total knee arthroplasty, left  Referral to Physical Therapy    Follow Up In Physical Therapy      2. History of total knee arthroplasty, right  Referral to Physical Therapy    Follow Up In Physical Therapy          Precautions:   Precautions  STEADI Fall Risk Score (The score of 4 or more indicates an increased risk of falling): 9      Medical History Form: Reviewed (scanned into chart)    Subjective:   Subjective   Chief Complaint: Patient presents to clinic with bilateral knee pain.  Had R TKA in 3/2022, L TKA 2023.  Has continued to have pain in both knees. Pain levels are roughly equal bilaterally. Also deals with R sided sciatic pain.  Will be seeing Pain Mgmt. On 24. Reports her balance is \"terrible.\" Has 2 falls in the past year. (1 in bedroom, 1 in yard).  Intermittently walks with a cane.   Onset Date: 3/1/2022  JOSÉ MIGUEL: Chronic, gradual    Current Condition:   Worse    Pain:  Pain level currently: Pain Score: 8/10  Pain level at worst: 10/10  Pain level at best: 5/10  Location: Bilateral knees, diffusely  Description: aching, soreness   Aggravating Factors: Stairs, walking, standing, sit-stand  Relieving Factors: Tramadol    Relevant Information:  PMH: Throat and neck cancer, Kidney disease, HTN, Migraines  Previous Tests/Imaging: x-rays: hardware placement in proper placement  Previous Interventions/Treatments: Physical Therapy at [facility] following TKAs    Prior Level of " "Function (PLOF)  Patient previously independent with all ADLs. Currently at 50% of PLOF.  Functional limitations: Walking >5 min, standing >10 min, non-reciprocal stairs, squatting  Exercise/Physical Activity: No exercise recently.  Used to work with a   Work/School: Retired    Patients Living Environment: Split level.  Stairs with rails. Lives alone. Family members live nearby    Primary Language: English    Patient's Goal(s) for Therapy: Strengthen, improve mobility, walk better    Red Flags: Do you have any of the following? No  Fever/chills, unexplained weight changes, dizziness/fainting, unexplained change in bowel or bladder functions, unexplained malaise or muscle weakness, night pain/sweats, numbness or tingling      Objective:  Objective     Posture: Obesity    Gait: No assistive device, antalgic, decreased stride length, decreased gait speed, lateral trunk lean    Observation: Well healed and closed TKA incisional scars         ROM    Hip AROM (Degrees)       (R)  (L)  Flexion:  90  90     Hip PROM (Degrees)       (R)  (L)  Flexion:  113  118     ER:   50  50     IR:   35  35     Knee AROM (Degrees)       (R)  (L)  Flexion:  116  107      Extension:  0  0                  Strength Testing    Hip     (R)  (L)  Flexion:  3-  3-      Extension:  Decreased lift with bridge             Knee     (R)  (L)  Flexion:  4+  4+      Extension:  4-  3-          Core strength:   Straight leg Raise: Major difficulty with SLR bilaterally.  Only lifts 2-3\" off of bed surface.  (-) quad lag on R, minor quad lag on L    Bridge: Reduced lift height, difficult and lacking control      Flexibility       (R)  (L)  Hamstrings:  WNL  WNL  Hip Flexors:  Minor whitaker Minor whitaker       Functional Movement  Sit to stand: Heavy UE reliance  Squat: Major reduction in depth (25 degrees only), with pain  Bed Mobility: Slow and labored       Balance    Single Leg Balance:  (R) 2\", lateral trunk lean and wobble (L)2\", lateral " trunk lean and wobble        Outcome Measures:  Other Measures  5x Sit to Stand: 34 seconds  Lower Extremity Funtional Score (LEFS): 24/80       Goals: Set and discussed today  Active       PT Problem       PT Goal 1       Start:  03/13/24    Expected End:  05/22/24       Bilateral Knee ROM 0-120 and without pain > 2/10 by week 8.    LE strength measures improved to at least 4+/5 by week 12.  Walk 20 minutes with minimal gait deviation and minimal difficulty by week 12.  Reciprocal ascent/descent of stairs with use of railing by week 12.  5x sit-stand time reduced by at least 10 seconds to demonstrated reduced falls risk and improved LE functional strength by week 12.   LEFS improved by at least 8 points by week 12.   Patient will rate pain < 3/10 at worst to improve ADL tolerance by week 12.  Independent with home exercise program for symptom reduction and functional gains by week 12.         PT Goal 2       Start:  03/13/24    Expected End:  06/11/24       Demonstrate ability to  object from the ground without loss of balance by week 6.    Demonstrate ambulation over and around obstacles in path without loss of balance by week 12.  Able to safely negotiate curbs with good control and without fear of loss of balance by week 12.  Maintain single leg balance with minimal support x 5 seconds by week 12.                 Plan of care was developed with input and agreement by the patient    Treatments:     Education: home exercise program, plan of care, orientation to pool     Therapeutic Exercise:    10 min  Instructed in initial home exercise program (Handout provided)    Personalized Home Exercise Program:   Access Code: [email or web address]  URL: [email or web address]  Date: 03/13/2024  Prepared by: Nina Arnold    Exercises  - Supine Transversus Abdominis Bracing - Hands on Ground  - 2 x daily - 7 x weekly - 20 reps - 5 seconds hold  - Hooklying Heel Slide  - 2 x daily - 7 x weekly - 20  reps  - Supine March  - 1 x daily - 7 x weekly - 3 sets - 10 reps  - Supine Bridge  - 1 x daily - 7 x weekly - 3 sets - 10 reps - 5 seconds hold  - Small Range Straight Leg Raise  - 1 x daily - 7 x weekly - 3 sets - 10 reps  - Supine Hip Adduction Isometric with Ball  - 1 x daily - 7 x weekly - 20 reps - 5 seconds hold  - Sit to Stand with Armchair  - 1 x daily - 7 x weekly - 2 sets - 10 reps  - Standing Heel Raise with Support  - 1 x daily - 7 x weekly - 3 sets - 10 reps        Assessment: Patient presents to PT with history of TKAs (R 2022, L 2023) with persistent chronic bilateral knee pain.  Upon examination, she demonstrates notable core and LE weakness, reduced knee flexion ROM, poor balance, and impaired gait and transfers resulting in limited participation in pain-free ADLs and inability to perform at her prior level of function. Pt would benefit from physical therapy to address the impairments found  in order to improve pain and function.          Plan:     Planned Interventions include: therapeutic exercise, self-care home management, manual therapy, therapeutic activities, gait training, neuromuscular coordination, vasopneumatic, aquatic therapy  Frequency: 2 x Week  Duration: 12 Weeks      Nina Arnold, PT

## 2024-03-14 ENCOUNTER — OFFICE VISIT (OUTPATIENT)
Dept: HEMATOLOGY/ONCOLOGY | Facility: CLINIC | Age: 69
End: 2024-03-14
Payer: MEDICARE

## 2024-03-14 VITALS
SYSTOLIC BLOOD PRESSURE: 126 MMHG | DIASTOLIC BLOOD PRESSURE: 85 MMHG | TEMPERATURE: 97.7 F | OXYGEN SATURATION: 97 % | BODY MASS INDEX: 35.81 KG/M2 | HEIGHT: 64 IN | RESPIRATION RATE: 18 BRPM | WEIGHT: 209.77 LBS | HEART RATE: 70 BPM

## 2024-03-14 DIAGNOSIS — C09.9 RIGHT TONSILLAR SQUAMOUS CELL CARCINOMA (MULTI): ICD-10-CM

## 2024-03-14 PROCEDURE — 1036F TOBACCO NON-USER: CPT | Performed by: PHYSICIAN ASSISTANT

## 2024-03-14 PROCEDURE — 3079F DIAST BP 80-89 MM HG: CPT | Performed by: PHYSICIAN ASSISTANT

## 2024-03-14 PROCEDURE — 3074F SYST BP LT 130 MM HG: CPT | Performed by: PHYSICIAN ASSISTANT

## 2024-03-14 PROCEDURE — 1125F AMNT PAIN NOTED PAIN PRSNT: CPT | Performed by: PHYSICIAN ASSISTANT

## 2024-03-14 PROCEDURE — 99214 OFFICE O/P EST MOD 30 MIN: CPT | Performed by: PHYSICIAN ASSISTANT

## 2024-03-14 PROCEDURE — 3008F BODY MASS INDEX DOCD: CPT | Performed by: PHYSICIAN ASSISTANT

## 2024-03-14 PROCEDURE — 1159F MED LIST DOCD IN RCRD: CPT | Performed by: PHYSICIAN ASSISTANT

## 2024-03-14 PROCEDURE — 1157F ADVNC CARE PLAN IN RCRD: CPT | Performed by: PHYSICIAN ASSISTANT

## 2024-03-14 ASSESSMENT — PATIENT HEALTH QUESTIONNAIRE - PHQ9
2. FEELING DOWN, DEPRESSED OR HOPELESS: MORE THAN HALF THE DAYS
8. MOVING OR SPEAKING SO SLOWLY THAT OTHER PEOPLE COULD HAVE NOTICED. OR THE OPPOSITE, BEING SO FIGETY OR RESTLESS THAT YOU HAVE BEEN MOVING AROUND A LOT MORE THAN USUAL: NOT AT ALL
SUM OF ALL RESPONSES TO PHQ QUESTIONS 1-9: 9
4. FEELING TIRED OR HAVING LITTLE ENERGY: SEVERAL DAYS
9. THOUGHTS THAT YOU WOULD BE BETTER OFF DEAD, OR OF HURTING YOURSELF: NOT AT ALL
7. TROUBLE CONCENTRATING ON THINGS, SUCH AS READING THE NEWSPAPER OR WATCHING TELEVISION: MORE THAN HALF THE DAYS
10. IF YOU CHECKED OFF ANY PROBLEMS, HOW DIFFICULT HAVE THESE PROBLEMS MADE IT FOR YOU TO DO YOUR WORK, TAKE CARE OF THINGS AT HOME, OR GET ALONG WITH OTHER PEOPLE: SOMEWHAT DIFFICULT
6. FEELING BAD ABOUT YOURSELF - OR THAT YOU ARE A FAILURE OR HAVE LET YOURSELF OR YOUR FAMILY DOWN: NOT AT ALL
5. POOR APPETITE OR OVEREATING: SEVERAL DAYS
1. LITTLE INTEREST OR PLEASURE IN DOING THINGS: MORE THAN HALF THE DAYS
SUM OF ALL RESPONSES TO PHQ9 QUESTIONS 1 AND 2: 4
3. TROUBLE FALLING OR STAYING ASLEEP OR SLEEPING TOO MUCH: SEVERAL DAYS

## 2024-03-14 ASSESSMENT — COLUMBIA-SUICIDE SEVERITY RATING SCALE - C-SSRS
6. HAVE YOU EVER DONE ANYTHING, STARTED TO DO ANYTHING, OR PREPARED TO DO ANYTHING TO END YOUR LIFE?: NO
2. HAVE YOU ACTUALLY HAD ANY THOUGHTS OF KILLING YOURSELF?: NO
1. IN THE PAST MONTH, HAVE YOU WISHED YOU WERE DEAD OR WISHED YOU COULD GO TO SLEEP AND NOT WAKE UP?: NO

## 2024-03-14 ASSESSMENT — PAIN SCALES - GENERAL: PAINLEVEL: 9

## 2024-03-15 NOTE — PROGRESS NOTES
Reason for Visit  Dilma John is a 69 y.o. 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 Ruff for concern for recurrence.    Patient initially found to have a right tonsillar lesion in May 2012 when she presented with discomfort and pain on swallowing.  CT scan of the neck  showed soft tissue asymmetry involving the right tonsillar  fossa with an exophytic component in the right aspect of the  oropharynx as well as involvement of the uvula. Panendoscopy with biopsy of the tonsil region which was positive for squamous cell carcinoma, positive for p16 and p63 staining.  The patient also had a PET-CT scan which  showed intensely hypermetabolic right palatine tonsil consistent with  malignancy with a focus of moderate FDG uptake within the right  aspect of the neck, possibly cervical lymph node.  The patient  treated with concurrent radiation and weekly cetuximab. Course complicated by kidney failure and nerve damage in her face.     On assessment, patient notes having similar symptoms from when initially diagnosed of hoarseness, coughing/vomiting up blood (usually happens with nose bleeds but now happening w/o it) since summer of 2023. Patient recently saw ENT, nasal endoscopy and laryngoscopy, unremarkable. Being referred to another ENT for better visualization. Last CT was a PE protocol done in 1/2022, Enlarged left lobe of the thyroid where with heterogeneity and substernal extension. There is tracheal deviation to the right. This could represent a unilateral mass or goiter. This represents a change compared prior chest CT dated 12/13/2013. Patient seeing ortho for chronic back pain. Otherwise doing well. Denies B symptoms, n/v/d/abd pain, SOB, CP, melena, neuropathy or any other complaints at this time.      PMH/PSH: Migraine HA, asthma, HTN, MI, CKD stage III, 2 knee  replacements, herniated disc, factor VII deficiency, fibroids s/p hysterectomy, appendectomy  FH: Denies FH of cancers, bleeding or clotting disorder.  Soc Hx: Denies smoking, ETOH, illicit drugs; Special , Single, 1 daughter.    History of Present Illness:  Continues to have voice hoarseness and hemoptysis. Otherwise doing well..    Review of Systems: All of the systems have been reviewed and are negative for complaints except what is stated in the HPI and/or past medical history.    Allergies   Allergen Reactions    Buspirone Unknown    Cefazolin Swelling    Clindamycin Swelling, Hives and Itching    Donepezil Unknown    Erythromycin Hives    Ibuprofen Other     Raises Blood Pressure    Latex Itching, Hives and Swelling     breaks out    Levofloxacin Swelling     inflamed bowel    Nsaids (Non-Steroidal Anti-Inflammatory Drug) Unknown    Sulfa (Sulfonamide Antibiotics) Hives    Benadryl Allergy Decongestant Palpitations     Rapid heart rate    Penicillins Rash and Hives              Outpatient Medication Profile:  Current Outpatient Medications   Medication Sig Dispense Refill    albuterol 2.5 mg /3 mL (0.083 %) nebulizer solution Inhale 3 mL (2.5 mg) every 6 hours if needed.      albuterol 90 mcg/actuation inhaler Inhale 2 puffs every 6 hours if needed for shortness of breath.      allopurinol (Zyloprim) 100 mg tablet TAKE 1 TABLET BY MOUTH EVERY DAY 90 tablet 2    aluminum hydrox-magnesium carb (Gaviscon Extra Strength) 160-105 mg tablet,chewable Chew 1 tablet once daily at bedtime. 60 tablet 0    azelastine (Astelin) 137 mcg (0.1 %) nasal spray Administer 2 sprays into affected nostril(s) 2 times a day.      benzonatate (Tessalon) 200 mg capsule Take 1 capsule (200 mg) by mouth 3 times a day as needed for cough.      bumetanide (Bumex) 0.5 mg tablet Take 1 tablet (0.5 mg) by mouth once daily. 90 tablet 3    busPIRone (Buspar) 15 mg tablet TAKE 1/2 TABLET BY MOUTH TWICE A DAY 90 tablet 1     butalbital-acetaminophen-caff -40 mg tablet TAKE 1 TABLET BY MOUTH AT ONSET OF HEADACHE THEN UP TO EVERY 6 HRS AS NEEDED. MAX 3 TABS PER DAY 20 tablet 3    cetirizine (ZyrTEC) 10 mg tablet TAKE 1 TABLET (10 MG) BY MOUTH ONCE DAILY AS NEEDED FOR ALLERGIES. 90 tablet 1    cholecalciferol (Vitamin D-3) 50 mcg (2,000 unit) capsule Take 1 capsule (50 mcg) by mouth once daily.      cloNIDine (Catapres) 0.1 mg tablet Take 1 tablet (0.1 mg) by mouth 3 times a day. 270 tablet 3    diclofenac sodium 1.5 % drops Apply 4 drops topically 4 times a day as needed.      DULoxetine (Cymbalta) 60 mg DR capsule Take 2 capsules (120 mg) by mouth once daily at bedtime. 180 capsule 1    erythromycin base (E-Mycin) 500 mg tablet Take 2 tablets (1,000 mg) by mouth. ONE HOUR BEFORE THE DENTAL PROCEDURE      fluticasone (Flonase) 50 mcg/actuation nasal spray Administer 1 spray into affected nostril(s) 2 times a day.      fluticasone-umeclidin-vilanter (Trelegy Ellipta) 100-62.5-25 mcg blister with device Inhale 1 puff early in the morning.. 3 each 2    gabapentin (Neurontin) 600 mg tablet Take 2 tablets (1,200 mg) by mouth 3 times a day.      hydrALAZINE (Apresoline) 25 mg tablet Take 1 tablet (25 mg) by mouth 3 times a day. 270 tablet 3    hydrOXYzine pamoate (Vistaril) 25 mg capsule TAKE 1 CAPSULE (25 MG) BY MOUTH 3 TIMES A DAY AS NEEDED FOR ANXIETY. 30 capsule 1    isosorbide mononitrate ER (Imdur) 60 mg 24 hr tablet Take 1 tablet (60 mg) by mouth once daily. 90 tablet 3    meclizine (Antivert) 12.5 mg tablet TAKE 1 TABLET BY MOUTH THREE TIMES A DAY AS NEEDED 30 tablet 1    metoclopramide (Reglan) 10 mg tablet Take 1 tablet (10 mg) by mouth twice a day.      mometasone (Nasonex) 50 mcg/actuation nasal spray ADMINISTER 2 SPRAYS INTO EACH NOSTRIL 2 TIMES A DAY. 17 g 2    multivitamin (Multiple Vitamins) tablet Take 1 tablet by mouth once daily.      neomycin-polymyxin-HC (Cortisporin) otic solution Administer 3 drops into each ear.  "3-4 TIMES DAILY.      nitroglycerin (Nitrostat) 0.3 mg SL tablet Place 1 tablet (0.3 mg) under the tongue every 5 minutes if needed for chest pain (3 DOSES. IF CHEST PAIN CONTINUES, CALL 911).      olopatadine (Pataday) 0.2 % ophthalmic solution Administer 1 drop into affected eye(s) once daily as needed for allergies (itchy eyes). 2.5 mL 2    ondansetron (Zofran) 4 mg tablet Take 1 tablet (4 mg) by mouth every 8 hours if needed for nausea.      pantoprazole (ProtoNix) 20 mg EC tablet TAKE 1 TABLET BY MOUTH EVERY DAY 90 tablet 3    tiZANidine (Zanaflex) 4 mg tablet Take 1.5 tablets (6 mg) by mouth once daily at bedtime. 45 tablet 2    verapamil (Calan) 120 mg tablet Take 1 tablet (120 mg) by mouth 3 times a day. 270 tablet 3     No current facility-administered medications for this visit.          2/1/2024     1:57 PM 2/9/2024    10:20 AM 2/12/2024     1:35 PM 2/21/2024    10:09 AM 2/22/2024    11:33 AM 2/23/2024     1:08 PM 3/14/2024    12:18 PM   Vitals   Systolic 120 141  110 130  126   Diastolic 88 93  70 86  85   Heart Rate 74 78  70 76  70   Temp     36.4 °C (97.5 °F)  36.5 °C (97.7 °F)   Resp     17  18   Height (in)  1.626 m (5' 4\")   1.621 m (5' 3.82\") 1.626 m (5' 4\") 1.621 m (5' 3.82\")    Weight (lb)  201 201 207 207.89 208 209.77   BMI  34.5 kg/m2 34.5 kg/m2 35.53 kg/m2 35.89 kg/m2 35.7 kg/m2 36.21 kg/m2   BSA (m2)  2.03 m2 2.03 m2 2.06 m2 2.06 m2 2.06 m2 2.07 m2   Visit Report Report Report Report Report Report Report Report       Significant value     Physical Exam:   Constitutional: alert, awake and oriented, not in acute distress   HEENT: moist mucous membranes, normal nose   Neck: supple, no lymphadenopathy   EYES: PERRL, EOM intact, conjunctiva normal  Skin: no jaundice, rash or erythema  Neurological: AAOx3, no gross focal deficit   Psychiatric: normal mood and behavior     Lab Results   Component Value Date    WBC 4.7 02/01/2024    NEUTROABS 2.50 02/01/2024    IGABSOL 0.01 02/01/2024    LYMPHSABS " "1.34 02/01/2024    MONOSABS 0.47 02/01/2024    EOSABS 0.27 02/01/2024    BASOSABS 0.06 02/01/2024    RBC 4.84 02/01/2024    MCV 85 02/01/2024    MCHC 30.9 (L) 02/01/2024    HGB 12.7 02/01/2024    HCT 41.1 02/01/2024     02/01/2024     No results found for: \"RETICCTPCT\"   Lab Results   Component Value Date    CREATININE 2.01 (H) 02/01/2024    BUN 30 (H) 02/01/2024    EGFR 27 (L) 02/01/2024     (H) 02/01/2024    K 4.6 02/01/2024     (H) 02/01/2024    CO2 28 02/01/2024      Lab Results   Component Value Date    ALT 9 02/01/2024    AST 13 02/01/2024    ALKPHOS 68 02/01/2024    BILITOT 0.3 02/01/2024      Lab Results   Component Value Date    TSH 7.06 (H) 02/01/2024     Lab Results   Component Value Date    TSH 7.06 (H) 02/01/2024     Lab Results   Component Value Date    SQODNTOY23 1,107 (H) 02/01/2024      No results found for: \"FOLATE\"  Lab Results   Component Value Date    CATIA NEGATIVE 11/03/2020    RF 11 11/03/2020    SEDRATE 14 02/23/2024      Lab Results   Component Value Date    CRP <0.10 02/23/2024     Lab Results   Component Value Date    SPEP Normal. 02/01/2024     NM PET CT head and neck initial staging    Result Date: 3/7/2024  Interpreted By:  Anuj Cabral and Weaver Jakob STUDY: NM PET CT HEAD AND NECK INITIAL STAGING;  3/7/2024 12:57 pm   INDICATION: Signs/Symptoms:h/o of right tonsillor SCC in 2012 now with voice horseness, hemoptysis c/f recurrence. Per EMR: Patient is a 68-year-old female with history of right tonsillar squamous cell carcinoma T2 N1 M0 HPV positive diagnosed in June 2012 treated with radiation, cetuximab. No current treatment. Patient presents with new hoarseness, concern for local disease recurrence.   COMPARISON: PET-CT 07/27/2012, CT neck 06/29/2012, 01/04/2013   ACCESSION NUMBER(S): [id number]   ORDERING CLINICIAN: OMARI ZELAYA   TECHNIQUE: DIVISION OF NUCLEAR MEDICINE POSITRON EMISSION TOMOGRAPHY (PET-CT)   The patient received an intravenous dose of 12.5 " mCi of Fluorine-18 fluorodeoxyglucose (FDG). Positron emission tomographic (PET) images from mid-thigh to skull vertex were then acquired after a one hour delay. Further images of the head and neck with the arms down were also obtained. Also acquired was a contemporaneous low dose non-contrast CT scan performed for attenuation correction of PET images and anatomic localization.  The PET and CT images were digitally fused for display.  All images were acquired on a combined PET-CT scanner unit.  Some areas of FDG accumulation may be described in standardized uptake value (SUV) units.   CODING: Initial Treatment Strategy (PI)   CALIBRATION: Dose Injection-to-Scan Interval (mins): 61 min Mediastinal bloodpool SUV (normal 1.5-2.5): 2.3 Blood glucose: 107 mg/dL   FINDINGS: HEAD AND NECK: No evidence of focal hypermetabolic lesion in the brain parenchyma, noting that evaluation is limited because of the expected physiologic diffuse FDG uptake in the brain. No focal hypermetabolic soft tissue lesion is seen in the neck. No hypermetabolic cervical lymphadenopathy is present. Photopenic region in the left lobe of the thyroid.   CHEST: No focal hypermetabolic lesion is seen in the lung parenchyma. No evidence of hypermetabolic mediastinal, hilar or axillary lymphadenopathy.   ABDOMEN AND PELVIS: No hypermetabolic soft tissue lesion is present in the abdomen and pelvis. No evidence of hypermetabolic lymphadenopathy. Physiologic radiotracer uptake is present in the liver and spleen with excretion into the bowel loops and the genitourinary tract.   MUSCULOSKELETAL: There is no focal hypermetabolic lesion to suggest osseous metastasis.       1. No hypermetabolic activity in the region of the right palatine tonsil to suggest disease recurrence or elsewhere to suggest FDG avid neoplasm. 2. Photopenic area in the left lobe of the thyroid likely correspond to cysts which further evaluation with thyroid ultrasound is recommended.   I  personally reviewed the images/study and I agree with the findings as stated by Singh Ash MD. This study was interpreted at [facility], [place], [place].   MACRO: None     Signed by: Anuj Cabral 3/7/2024 4:31 PM Dictation workstation:   [id number]    XR knee left 3 views    Result Date: 2/24/2024  Interpreted By:  Nito Hoskins, STUDY: XR KNEE RIGHT 3 VIEWS; XR KNEE LEFT 3 VIEWS;  2/23/2024 2:42 pm; 2/23/2024 1:06 pm   INDICATION: Signs/Symptoms:f/u TKA; Signs/Symptoms:regular f/u TKA.   COMPARISON: none   ACCESSION NUMBER(S): [id number]; [id number]   ORDERING CLINICIAN: CARMEN TAVAREZ   FINDINGS: Three views bilateral knee   Total right knee arthroplasty without evidence of hardware complication. No fracture or dislocation. No osseous lesion. No effusion.   Left knee Total knee arthroplasty without evidence of hardware complication. No effusion. No fracture or dislocation. No osseous lesion.   IMPRESSION Bilateral knee Total knee arthroplasty without evidence of hardware complication.     MACRO none   Signed by: Nito Hoskins 2/24/2024 9:16 PM Dictation workstation:   [id number]    XR knee right 3 views    Result Date: 2/24/2024  Interpreted By:  Nito Hoskins, STUDY: XR KNEE RIGHT 3 VIEWS; XR KNEE LEFT 3 VIEWS;  2/23/2024 2:42 pm; 2/23/2024 1:06 pm   INDICATION: Signs/Symptoms:f/u TKA; Signs/Symptoms:regular f/u TKA.   COMPARISON: none   ACCESSION NUMBER(S): [id number]; [id number]   ORDERING CLINICIAN: CARMEN TAVAREZ   FINDINGS: Three views bilateral knee   Total right knee arthroplasty without evidence of hardware complication. No fracture or dislocation. No osseous lesion. No effusion.   Left knee Total knee arthroplasty without evidence of hardware complication. No effusion. No fracture or dislocation. No osseous lesion.   IMPRESSION Bilateral knee Total knee arthroplasty without evidence of hardware complication.     MACRO none   Signed by: Nito Hoskins 2/24/2024  9:16 PM Dictation workstation:   [id number]    US thyroid    Result Date: 2/23/2024  Interpreted By:  Hector Painter, STUDY: US THYROID;  2/22/2024 2:30 pm   INDICATION: Signs/Symptoms:r/o thyroid mass.   COMPARISON: 04/05/2013.   ACCESSION NUMBER(S): [id number]   ORDERING CLINICIAN: OMARI ZELAYA   TECHNIQUE: Multiple ultrasonographic images of the thyroid gland and surrounding tissues were obtained.   FINDINGS: PARENCHYMA:  Mildly heterogenous background echogenicity.   SIZE: RIGHT LOBE: 3.5 x 1 x 1 cm LEFT LOBE: 7.3 x 3.4 x 4.4 cm ISTHMUS: 4 mm in AP diameter   NODULES: (Please note, assessment and description of nodules is per TI-RADS criteria. Up to 4 total nodules described, which includes largest and/or most clinically significant based on morphology.) It is noted that some spongiform and/or cystic nodules may not be specifically described and are TR category 1 (benign).   NODULE #: 1.   Location: Occupying the majority of the left thyroid lobe. Size: 6.2 x 3.1 x 3.8 Composition:  mixed cystic and solid (1) Echogenicity:  Hypoechoic (2). There is mixed echogenicity of the solid components with both hyperechoic and slightly hypoechoic portions. Shape:  Wider-than-tall (0) Margin:  Smooth (0) Echogenic Foci:  None or Large comet-tail artifacts (0)   If present previously: Significant change in size (>/= 20% diameter increase in at least two dimensions and minimal increase of 2mm?): Yes, increased.   The total score of this nodule is 3 points, corresponding to a TI-RADS category  3; (3 points) Mildly suspicious.   CERVICAL LYMPH NODES: Nonspecific mildly enlarged left cervical level 4 nodes measure 1.5 x 0.6 x 0.7 cm and 1.5 x 0.6 x 0.8 cm respectively.       1. Asymmetric enlargement of the left thyroid lobe with a large mixed solid and cystic indeterminate nodule occupying the majority of the left lobe measuring up to 6.2 cm in diameter. FNA may be considered. 2. Nonspecific mildly enlarged left cervical  level 4 nodes.         Please note that these statements are based on the recommendations of the American College of Radiology   TI-RADS grading system. ACR TI-RADS recommendations (apply to nodules which have NOT been biopsied):   TR5 (?7 points) highly suspicious - FNA if ? 1cm, follow-up if 0.5 -0.9 cm every year for 5 years. Aggregate cancer risk 35%. TR4 (4-6 points) moderately suspicious - FNA if ? 1.5cm, follow-up if 1 -1.4 cm in 1, 2, 3 and 5 years. Aggregate cancer risk 9.1% TR3 (3 points) mildly suspicious - FNA if ? 2.5cm, follow-up if 1.5 -2.4 cm in 1, 3 and 5 years. Aggregate cancer risk 4.8% TR2 (2 points) not suspicious. Aggregate cancer risk 1.5% TR1 (0 points) benign - No FNA or follow-up. Aggregate cancer risk 0.3%   MACRO: None.   Signed by: Hector Painter 2/23/2024 5:41 PM Dictation workstation:   [id number]    XR chest 2 views    Result Date: 2/16/2024  Interpreted By:  Noman Reich, STUDY: XR CHEST 2 VIEWS;  2/15/2024 4:48 pm   INDICATION: Signs/Symptoms:chronic coughx1 year.   COMPARISON: Chest radiograph 01/04/2022   ACCESSION NUMBER(S): [id number]   ORDERING CLINICIAN: JESUS DE LUNA   FINDINGS:     CARDIOMEDIASTINAL SILHOUETTE: Cardiomediastinal silhouette is stable in size and configuration.   LUNGS: No consolidation, pneumothorax, or effusion.   ABDOMEN: No remarkable upper abdominal findings.   BONES: No acute osseous changes.   Remaining findings appear stable since prior comparison radiograph.       1.  No evidence of acute cardiopulmonary process.     Signed by: Noman Reich 2/16/2024 10:39 PM Dictation workstation:   [id number]     Assessment:    68 y.o. 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    Symptomatic with hoarseness, coughing/vomiting up blood     Plan:    Reviewed and discussed lab, imaging, and  pathology results with patient in detail as well as diagnosis, prognosis, and treatment options.    PET/CT w/no hypermetabolic activity in the region of the right palatine tonsil to suggest disease recurrence or elsewhere to suggest FDG avid neoplasm. 2. Photopenic area in the left lobe of the thyroid likely correspond to cysts. US w/Asymmetric enlargement of the left thyroid lobe with a large mixed solid and cystic indeterminate nodule occupying the majority of the left lobe measuring up to 6.2 cm in diameter.    Discussed referral to Endo for biopsy    Referral to GI for repeat c-scope and EGD.    Patient would like second opinion from ENT; referral made.    F/U w/PCP    RTC in 1 year or sooner as needed.    Patient verbalized understanding, and all her questions were answered to her satisfaction    30 min spent with patient greater than 50 % of which was spent in consultation and coordination of care.

## 2024-03-16 DIAGNOSIS — F41.9 ANXIETY: ICD-10-CM

## 2024-03-18 RX ORDER — HYDROXYZINE PAMOATE 25 MG/1
25 CAPSULE ORAL 3 TIMES DAILY PRN
Qty: 30 CAPSULE | Refills: 1 | Status: SHIPPED | OUTPATIENT
Start: 2024-03-18

## 2024-03-19 DIAGNOSIS — M26.629 TEMPOROMANDIBULAR JOINT-PAIN-DYSFUNCTION SYNDROME: ICD-10-CM

## 2024-03-19 DIAGNOSIS — G43.709 CHRONIC MIGRAINE WITHOUT AURA WITHOUT STATUS MIGRAINOSUS, NOT INTRACTABLE: ICD-10-CM

## 2024-03-19 RX ORDER — TIZANIDINE 4 MG/1
6 TABLET ORAL NIGHTLY
Qty: 135 TABLET | Refills: 1 | Status: SHIPPED | OUTPATIENT
Start: 2024-03-19

## 2024-03-20 ENCOUNTER — TREATMENT (OUTPATIENT)
Dept: PHYSICAL THERAPY | Facility: CLINIC | Age: 69
End: 2024-03-20
Payer: MEDICARE

## 2024-03-20 DIAGNOSIS — Z96.652 HISTORY OF TOTAL KNEE ARTHROPLASTY, LEFT: Primary | ICD-10-CM

## 2024-03-20 DIAGNOSIS — Z96.651 HISTORY OF TOTAL KNEE ARTHROPLASTY, RIGHT: ICD-10-CM

## 2024-03-20 PROBLEM — Z96.653 HISTORY OF TOTAL KNEE ARTHROPLASTY, BILATERAL: Status: ACTIVE | Noted: 2024-03-20

## 2024-03-20 PROCEDURE — 97113 AQUATIC THERAPY/EXERCISES: CPT | Mod: GP,CQ | Performed by: SPECIALIST/TECHNOLOGIST

## 2024-03-20 RX ORDER — BUTALBITAL, ACETAMINOPHEN AND CAFFEINE 50; 325; 40 MG/1; MG/1; MG/1
TABLET ORAL
Qty: 20 TABLET | Refills: 3 | OUTPATIENT
Start: 2024-03-20

## 2024-03-20 ASSESSMENT — PAIN SCALES - GENERAL: PAINLEVEL_OUTOF10: 8

## 2024-03-20 ASSESSMENT — PAIN - FUNCTIONAL ASSESSMENT: PAIN_FUNCTIONAL_ASSESSMENT: 0-10

## 2024-03-20 NOTE — PROGRESS NOTES
Physical Therapy  Physical Therapy Treatment    Patient Name: Dilma John  MRN: [id number]  Today's Date: 3/20/2024  Time Calculation  Start Time: 1200  Stop Time: 1244  Time Calculation (min): 44 min    Insurance:  Visit number: 2 of [facility] nec  Authorization info: No Auth needed  Insurance Type: [employer] Advantage  Cert date start: 3/13/2024        Cert date end: 6/11/2024                General  Referred By: RIGOBERTO Ott MD    Current Problem  1. History of total knee arthroplasty, left  Follow Up In Physical Therapy      2. History of total knee arthroplasty, right  Follow Up In Physical Therapy          Precautions  STEADI Fall Risk Score (The score of 4 or more indicates an increased risk of falling): 9    Pain Assessment: 0-10  Pain Score: 8    Subjective:   Patient reports knee pain with change to cold weather.  Patient notes having a little soreness and stiffness after initial evaluation.    HEP Performed:  Yes    Objective:   Slightly forward posture      Treatments:   Pool Depth: 4 foot, Temperature 92°  Forward walk 3'   Retro walk 3'  Side Step 4'   MIP 4'  Alt Hams 4'  HR/TR 2'  Hip ext R/L 2' each   Hip abd/add R/L 2' each   Floats: Shoulder extension R/L - 2' wide base 2' narrow base, horiz abd 2'   Standing HF stretch R/L 1'  Standing Hams stretch on stair R/L 1'     Charges: AQ x3 (cq)     Assessment: Patient tolerated intensity noting feeling heavier post treatment.  Patient notes L knee pain decreased to 5 of 10 and R knee 7 of 10 post treatment.        Plan:  continue aquatic/land sessions to improve LE strength, flexibility and balance to improve gait and ADL.      Armaan Burnette, PTA

## 2024-04-02 ENCOUNTER — OFFICE VISIT (OUTPATIENT)
Dept: PAIN MEDICINE | Facility: HOSPITAL | Age: 69
End: 2024-04-02
Payer: MEDICARE

## 2024-04-02 DIAGNOSIS — M48.061 FORAMINAL STENOSIS OF LUMBAR REGION: ICD-10-CM

## 2024-04-02 PROCEDURE — 3008F BODY MASS INDEX DOCD: CPT | Performed by: ANESTHESIOLOGY

## 2024-04-02 PROCEDURE — 1125F AMNT PAIN NOTED PAIN PRSNT: CPT | Performed by: ANESTHESIOLOGY

## 2024-04-02 PROCEDURE — 1157F ADVNC CARE PLAN IN RCRD: CPT | Performed by: ANESTHESIOLOGY

## 2024-04-02 PROCEDURE — 99214 OFFICE O/P EST MOD 30 MIN: CPT | Performed by: ANESTHESIOLOGY

## 2024-04-02 PROCEDURE — 1159F MED LIST DOCD IN RCRD: CPT | Performed by: ANESTHESIOLOGY

## 2024-04-02 ASSESSMENT — PAIN SCALES - GENERAL: PAINLEVEL: 8

## 2024-04-02 NOTE — PROGRESS NOTES
Pain Management Clinic Note     Chief Complaint: Low back pain follow up      History Of Present Illness  Dilma John is a 69 y.o. female with a past medical history of chronic sinusitis hypertension low back pain with prior injection who is presenting to the pain clinic for follow-up of back pain and for referral for another injection. Most recently received R. L5 transforaminal KATTY with methylprednisone in 01/2023.  The patient states that she had 80% relief for roughly 6 months until she had a fall in July 2023 where she fell backwards and hit her head and back.  Since that time she has had worsening sciatic pain, it is worse it was prior to her previous injection.  She is now finding it difficult to lie on her side, and has restricted movement of the right leg.    Pain is throbbing in quality, and patient states it is most pronounced in the R. Posterior thigh/buttocks area. The pain has been present since her fall in July 2023. The patient has tried heat packs and tylenol to relieve the pain, with mild relief. 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 The pain does impact the patients ability to complete tasks and activities that they were previously able to complete. She feels the pain is worst while standing and she gets some relief after sitting down. She reports no associated weakness but does have some feelings of numbness and tingling in the Right lateral thigh.     Patient was last seen 1/16/2023 with primary diagnosis of Lumbar radiculopathy.     Most recent imaging 02/09/2023  MRI Lumbar Spine:   IMPRESSION:  Degenerative changes of the lumbar spine as above described minimally  increasing at the L5-S1 level    Previous treatments include:   Medications: Gabapentin 600mg TID   Physical Therapy: Currently in therapy for knees   Injections: Previous L5-S1 ESIs   Surgery: None      The pain causes significant stress in the patient's life, specifically  interferes with general activity, mood, walking ability, ability to perform tasks at home and/or work.  Patient participates in physical therapy and continues to perform physician directed exercises at home. Denies any bowel or bladder incontinence, saddle anesthesia, worsening pain, weakness or falls.     Past Medical History  She has a past medical history of Chronic sinusitis, unspecified (04/26/2021), Essential (primary) hypertension (07/05/2022), and Personal history of other diseases of the nervous system and sense organs (12/30/2021).    Surgical History  She has a past surgical history that includes Other surgical history (04/17/2019); Other surgical history (04/17/2019); Other surgical history (04/17/2019); and MR angio head wo IV contrast (6/13/2013).     Social History  She reports that she has never smoked. She has never used smokeless tobacco. She reports that she does not currently use alcohol. She reports that she does not use drugs.    Family History  Family History   Problem Relation Name Age of Onset    Arthritis Mother      Asthma Other      Diabetes Other      Hypertension Other      Heart attack Other      Stroke Other          Allergies  Buspirone, Cefazolin, Clindamycin, Donepezil, Erythromycin, Ibuprofen, Latex, Levofloxacin, Nsaids (non-steroidal anti-inflammatory drug), Sulfa (sulfonamide antibiotics), Benadryl allergy decongestant, and Penicillins    Review of Symptoms:   Constitutional: Negative for chills, diaphoresis or fever  HENT: Negative for neck swelling  Eyes:.  Negative for eye pain  Respiratory:.  Negative for cough, shortness of breath or wheezing    Cardiovascular:.  Negative for chest pain or palpitations  Gastrointestinal:.  Negative for abdominal pain, nausea and vomiting  Genitourinary:.  Negative for urgency  Musculoskeletal:  Positive for back pain. Positive for joint pain. Denies falls within the past 3 months.  Skin: Negative for wounds or itching   Neurological:  Negative for dizziness, seizures, loss of consciousness and weakness  Endo/Heme/Allergies: Does not bruise/bleed easily  Psychiatric/Behavioral: Negative for depression. The patient does not appear anxious.       Physical Exam  Constitutional:       General: She is not in acute distress.     Appearance: Normal appearance.   HENT:      Head: Normocephalic.      Nose: Nose normal.      Mouth/Throat:      Mouth: Mucous membranes are moist.      Pharynx: Oropharynx is clear.   Eyes:      Extraocular Movements: Extraocular movements intact.      Conjunctiva/sclera: Conjunctivae normal.      Pupils: Pupils are equal, round, and reactive to light.   Cardiovascular:      Rate and Rhythm: Normal rate and regular rhythm.   Pulmonary:      Effort: Pulmonary effort is normal.      Breath sounds: Normal breath sounds.   Musculoskeletal:         General: Tenderness present. Normal range of motion.      Cervical back: Normal range of motion.   Skin:     General: Skin is warm and dry.   Neurological:      General: No focal deficit present.      Mental Status: She is alert and oriented to person, place, and time.   Psychiatric:         Mood and Affect: Mood normal.          Advanced Exam   Inspection: No gross deformities, no surgical scars  Palpation: No tenderness of patient of lumbar midline, lumbar paraspinals, bilateral SI joints  ROM: Normal range of motion of the lumbar flexion extension  Motor: 5/5 strength upper and lower extremities  Sensory: Negative for sensory abnormalities in upper and lower extremities  Reflexes: 2+ reflexes bilateral upper and lower extremities  Lumbar: Positive straight leg raising on the Right, negative for facet loading  Sacral: Negative Marcy,   Hip: Negative for pain with anterior, lateral, posterior palpation of hip joints, negative for internal/external rotation of the hip     Last Recorded Vitals  There were no vitals taken for this visit.    Relevant Results  Current Outpatient Medications    Medication Instructions    albuterol 90 mcg/actuation inhaler 2 puffs, inhalation, Every 6 hours PRN    albuterol 2.5 mg, inhalation, Every 6 hours PRN    allopurinol (Zyloprim) 100 mg tablet TAKE 1 TABLET BY MOUTH EVERY DAY    aluminum hydrox-magnesium carb (Gaviscon Extra Strength) 160-105 mg tablet,chewable 1 tablet, oral, Nightly    azelastine (Astelin) 137 mcg (0.1 %) nasal spray 2 sprays, nasal, 2 times daily    benzonatate (Tessalon) 200 mg capsule 1 capsule, oral, 3 times daily PRN    bumetanide (BUMEX) 0.5 mg, oral, Daily    busPIRone (Buspar) 15 mg tablet TAKE 1/2 TABLET BY MOUTH TWICE A DAY    butalbital-acetaminophen-caff -40 mg tablet TAKE 1 TABLET BY MOUTH AT ONSET OF HEADACHE THEN UP TO EVERY 6 HRS AS NEEDED. MAX 3 TABS PER DAY    cetirizine (ZYRTEC) 10 mg, oral, Daily PRN    cholecalciferol (Vitamin D-3) 50 mcg (2,000 unit) capsule 1 capsule, oral, Daily    cloNIDine (CATAPRES) 0.1 mg, oral, 3 times daily    diclofenac sodium 1.5 % drops 4 drops, Topical, 4 times daily PRN    DULoxetine (CYMBALTA) 120 mg, oral, Nightly    erythromycin base (E-MYCIN) 1,000 mg, oral, ONE HOUR BEFORE THE DENTAL PROCEDURE<BR>    fluticasone (Flonase) 50 mcg/actuation nasal spray 1 spray, nasal, 2 times daily    fluticasone-umeclidin-vilanter (Trelegy Ellipta) 100-62.5-25 mcg blister with device 1 puff, inhalation, Daily    gabapentin (Neurontin) 600 mg tablet 2 tablets, oral, 3 times daily    hydrALAZINE (APRESOLINE) 25 mg, oral, 3 times daily    hydrOXYzine pamoate (VISTARIL) 25 mg, oral, 3 times daily PRN    isosorbide mononitrate ER (IMDUR) 60 mg, oral, Daily    meclizine (Antivert) 12.5 mg tablet TAKE 1 TABLET BY MOUTH THREE TIMES A DAY AS NEEDED    metoclopramide (REGLAN) 10 mg, oral, 2 times daily    mometasone (Nasonex) 50 mcg/actuation nasal spray 2 sprays, Each Nostril, 2 times daily    multivitamin (Multiple Vitamins) tablet 1 tablet, oral, Daily    neomycin-polymyxin-HC (Cortisporin) otic solution 3  drops, Each Ear, 3-4 TIMES DAILY.    nitroglycerin (NITROSTAT) 0.3 mg, sublingual, Every 5 min PRN    olopatadine (Pataday) 0.2 % ophthalmic solution 1 drop, ophthalmic (eye), Daily PRN    ondansetron (Zofran) 4 mg tablet 1 tablet, oral, Every 8 hours PRN    pantoprazole (PROTONIX) 20 mg, oral, Daily    tiZANidine (ZANAFLEX) 6 mg, oral, Nightly    verapamil (CALAN) 120 mg, oral, 3 times daily         MR LUMBAR SPINE WO IV CONTRAST 02/09/2023    Narrative  MRN: [id number]  Patient Name: DEE DEE CASTELLANOS    STUDY:  MRI L-SPINE WO;  2/9/2023 2:39 pm    INDICATION:  low back pain  M54.16: Lumbar neuritis.    COMPARISON:  04/26/2021    ACCESSION NUMBER(S):  [id number]    ORDERING CLINICIAN:  KARLEY FORMAN    TECHNIQUE:  Sagittal T1, T2, STIR, axial T1 and T2 weighted images of the lumbar  spine were acquired.    FINDINGS:  Minimal grade 1 anterolisthesis L5 over S1 appearing or minimally  increasing since the prior exam. Persistent disc desiccation L5-S1  with moderate loss of intervertebral disc space height. Height  alignment and signal of the lumbar vertebral bodies are otherwise  preserved. Conus medullaris terminates appropriately at the L1 level.    T12-L1:  There is no significant central canal or neural foraminal  stenosis.    L1-2:  There is no significant central canal or neural foraminal  stenosis.    L2-3:  There is no significant central canal or neural foraminal  stenosis.    L3-4:  Disc bulge minimally indents the ventral thecal sac and  minimally narrows the neuroforamina.  Small left facet effusion with  minimal to moderate left facet hypertrophic facet changes increasing  since the prior exam.    L4-5:  There is no significant central canal stenosis. Hypertrophic  facet changes minimally narrow the neuroforamina    L5-S1: Disc bulge/pseudo bulge minimally indents the ventral thecal  sac and moderately narrows the neuroforamina in combination with  hypertrophic facet changes    Impression  Degenerative  changes of the lumbar spine as above described minimally  increasing at the L5-S1 level     No image results found.       No diagnosis found.     ASSESSMENT/PLAN  Dilma John is a 69 y.o. female with a past medical history of chronic sinusitis hypertension low back pain with prior injection who is presenting to the pain clinic for follow-up of back pain and for referral for another injection.  Based on history, available imaging and physical exam, the patient's primary pain etiology is likely lumbar radiculopathy due to the known foraminal stenosis in the patients spine as well as her classic picture of low back pain radiating down the leg. She has previously had great results from Epidural steroid injections, and it would be appropriate to repeat them at this time. If the patient does not have the same benefit that she has had previously then repeating an MRI would be beneficial at that time.        Our plan is as follows:  - Plan to repeat R. L5/S1 transforaminal epidural steroid injection.  Procedure, risk, benefits, alternatives reviewed with the patient.  Patient long-term relief which was high percentage.  If we do not see the same relief in the future we may consider an MRI.  - Referral to add in back physical therapy to knee therapy sessions.  - Continue to participate in physical therapy as well as physician directed home exercises  - Continue pain medications as prescribed       Oj Toledo MD

## 2024-04-03 ENCOUNTER — TREATMENT (OUTPATIENT)
Dept: PHYSICAL THERAPY | Facility: CLINIC | Age: 69
End: 2024-04-03
Payer: MEDICARE

## 2024-04-03 DIAGNOSIS — Z96.651 HISTORY OF TOTAL KNEE ARTHROPLASTY, RIGHT: ICD-10-CM

## 2024-04-03 DIAGNOSIS — Z96.652 HISTORY OF TOTAL KNEE ARTHROPLASTY, LEFT: ICD-10-CM

## 2024-04-03 PROCEDURE — 97113 AQUATIC THERAPY/EXERCISES: CPT | Mod: GP,CQ

## 2024-04-03 ASSESSMENT — PAIN - FUNCTIONAL ASSESSMENT: PAIN_FUNCTIONAL_ASSESSMENT: 0-10

## 2024-04-03 ASSESSMENT — PAIN SCALES - GENERAL: PAINLEVEL_OUTOF10: 6

## 2024-04-03 NOTE — PROGRESS NOTES
Physical Therapy  Physical Therapy Treatment    Patient Name: Dilma John  MRN: [id number]  Today's Date: 4/3/2024    Time Calculation  Start Time: 1310  Stop Time: 1353  Time Calculation (min): 43 min    Insurance:  Visit number: 3 of [facility]  Authorization info: No Auth needed  Insurance Type: [employer] Advantage  Cert date start: 3/13/2024        Cert date end: 6/11/2024           Current Problem  1. History of total knee arthroplasty, left  Follow Up In Physical Therapy      2. History of total knee arthroplasty, right  Follow Up In Physical Therapy          Precautions  Precautions  STEADI Fall Risk Score (The score of 4 or more indicates an increased risk of falling): 9    Pain  Pain Assessment: 0-10  Pain Score: 6    Subjective:   Subjective   Patient reports getting a new script for physical therapy for their back. Pt reports back is worse than the knees today and that they were sore after last treatment session.     HEP Compliance: Fair    Objective:   Objective     Double UE support entering and exiting pool.    Treatments:     Exercise Sets/Reps Comments   Fwd/bwd walking 6' All performed 4ft depth 92 degrees    Side steps  5'    Mini Squats  5'    Hamstring curls 5'    HR/TR 5'    Standing HF stretch  2' Each leg    Hip abduction  4'    Hip extension 4'    Charges: Aqx3     Assessment:    Pt was able to tolerate all new exercises performed in this treatment session with no increase in pain aside from fatigue. Pt was given verbal cueing to decrease range of motion hip extension was performed in to decrease lower back compensation.     Plan:    Have evaluating PT perform back evaluation in the beginning of next treatment visit.     Dagoberto Ozuna, PTA

## 2024-04-04 ENCOUNTER — OFFICE VISIT (OUTPATIENT)
Dept: NEUROLOGY | Facility: CLINIC | Age: 69
End: 2024-04-04
Payer: MEDICARE

## 2024-04-04 VITALS
HEART RATE: 95 BPM | HEIGHT: 62 IN | WEIGHT: 201 LBS | BODY MASS INDEX: 36.99 KG/M2 | TEMPERATURE: 96.9 F | SYSTOLIC BLOOD PRESSURE: 113 MMHG | DIASTOLIC BLOOD PRESSURE: 70 MMHG

## 2024-04-04 DIAGNOSIS — G43.709 CHRONIC MIGRAINE WITHOUT AURA WITHOUT STATUS MIGRAINOSUS, NOT INTRACTABLE: ICD-10-CM

## 2024-04-04 DIAGNOSIS — M53.0 CERVICOCRANIAL SYNDROME: ICD-10-CM

## 2024-04-04 DIAGNOSIS — G43.719 INTRACTABLE CHRONIC MIGRAINE WITHOUT AURA AND WITHOUT STATUS MIGRAINOSUS: Primary | ICD-10-CM

## 2024-04-04 PROCEDURE — 3074F SYST BP LT 130 MM HG: CPT | Performed by: PSYCHIATRY & NEUROLOGY

## 2024-04-04 PROCEDURE — 1036F TOBACCO NON-USER: CPT | Performed by: PSYCHIATRY & NEUROLOGY

## 2024-04-04 PROCEDURE — 99214 OFFICE O/P EST MOD 30 MIN: CPT | Performed by: PSYCHIATRY & NEUROLOGY

## 2024-04-04 PROCEDURE — 1159F MED LIST DOCD IN RCRD: CPT | Performed by: PSYCHIATRY & NEUROLOGY

## 2024-04-04 PROCEDURE — 3008F BODY MASS INDEX DOCD: CPT | Performed by: PSYCHIATRY & NEUROLOGY

## 2024-04-04 PROCEDURE — 3078F DIAST BP <80 MM HG: CPT | Performed by: PSYCHIATRY & NEUROLOGY

## 2024-04-04 PROCEDURE — 1157F ADVNC CARE PLAN IN RCRD: CPT | Performed by: PSYCHIATRY & NEUROLOGY

## 2024-04-04 RX ORDER — BUTALBITAL, ACETAMINOPHEN AND CAFFEINE 50; 325; 40 MG/1; MG/1; MG/1
1 TABLET ORAL ONCE AS NEEDED
Qty: 20 TABLET | Refills: 3 | Status: SHIPPED | OUTPATIENT
Start: 2024-04-04 | End: 2024-08-02

## 2024-04-04 RX ORDER — DEXAMETHASONE 2 MG/1
TABLET ORAL
Qty: 30 TABLET | Refills: 0 | Status: SHIPPED | OUTPATIENT
Start: 2024-04-04

## 2024-04-04 NOTE — PROGRESS NOTES
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  She is on two other medications used for migraine prophylaxis, but used for other items in this patient, and this also hasn't reduced the number of headaches.    PLAN:  Dexamethasone taper over 10 days from 10 mg to abort headache.  If not helpful, I will add an injectable CGRP inhibitor.  Her insurance covers Ajovy and Emgality.  I will see her in 3-4 months or prn, but I asked her to call in 2-3 weeks with progress on the headaches so I can proceed to the next step.      Brice Jean Jr., M.D., FAAN   ----------    I have personally reviewed the [place]AR[place] report for Dilma John   I have considered the risks of abuse, dependence, addiction and diversion.   Controlled Substance Agreement:   I have printed this form and reviewed each line item with the patient and the patient has verbalized understanding.   Date of the last Controlled Substance Agreement:  4/4/2024    Subjective     Dilma John is a 69 y.o. year old female here for follow-up.    She reports daily migraine headaches for the last six months, even though she has been on gabapentin 600 mg tid  She also has cervical and occipital tightness, which is helped by tizanidine; worse since she fell backward in 7/2024, striking the back of the head.    HEADACHE HISTORY:    Headache type:  No aura.  Crown of head pounding with photophobia, phonophobia, nausea, but no emesis.    Number of headache days per week/month: 30 days a month  Number of months with headache frequency:  Six months     Abortive medications tried for migraine: ASA, acetaminophen, ibuprofen, naproxen, butalbital/APAP/caffeine     Prophylactic medications tried for migraine: gabapentin  (currently taking, not helping), verapamil (currently taking, not helping), duloxetine (currently taking, not helping),       Past Medical History:   Diagnosis Date    Chronic sinusitis, unspecified 04/26/2021    Sinusitis    Essential (primary) hypertension 07/05/2022    Benign essential hypertension    Personal history of other diseases of the nervous system and sense organs 12/30/2021    History of migraine headaches     Past Surgical History:   Procedure Laterality Date    MR HEAD ANGIO WO IV CONTRAST  6/13/2013    MR HEAD ANGIO WO IV CONTRAST 6/13/2013 [facility]    OTHER SURGICAL HISTORY  04/17/2019    Hysterectomy    OTHER SURGICAL HISTORY  04/17/2019    Uterine myomectomy    OTHER SURGICAL HISTORY  04/17/2019    Tonsillectomy     Social History     Tobacco Use    Smoking status: Never    Smokeless tobacco: Never   Substance Use Topics    Alcohol use: Not Currently     family history includes Arthritis in her mother; Asthma in an other family member; Diabetes in an other family member; Heart attack in an other family member; Hypertension in an other family member; Stroke in an other family member.    Current Outpatient Medications:     albuterol 2.5 mg /3 mL (0.083 %) nebulizer solution, Inhale 3 mL (2.5 mg) every 6 hours if needed., Disp: , Rfl:     albuterol 90 mcg/actuation inhaler, Inhale 2 puffs every 6 hours if needed for shortness of breath., Disp: , Rfl:     allopurinol (Zyloprim) 100 mg tablet, TAKE 1 TABLET BY MOUTH EVERY DAY, Disp: 90 tablet, Rfl: 2    aluminum hydrox-magnesium carb (Gaviscon Extra Strength) 160-105 mg tablet,chewable, Chew 1 tablet once daily at bedtime., Disp: 60 tablet, Rfl: 0    azelastine (Astelin) 137 mcg (0.1 %) nasal spray, Administer 2 sprays into affected nostril(s) 2 times a day., Disp: , Rfl:     benzonatate (Tessalon) 200 mg capsule, Take 1 capsule (200 mg) by mouth 3 times a day as needed for cough., Disp: , Rfl:     bumetanide (Bumex) 0.5 mg tablet, Take 1  tablet (0.5 mg) by mouth once daily., Disp: 90 tablet, Rfl: 3    busPIRone (Buspar) 15 mg tablet, TAKE 1/2 TABLET BY MOUTH TWICE A DAY, Disp: 90 tablet, Rfl: 1    butalbital-acetaminophen-caff -40 mg tablet, TAKE 1 TABLET BY MOUTH AT ONSET OF HEADACHE THEN UP TO EVERY 6 HRS AS NEEDED. MAX 3 TABS PER DAY, Disp: 20 tablet, Rfl: 3    cetirizine (ZyrTEC) 10 mg tablet, TAKE 1 TABLET (10 MG) BY MOUTH ONCE DAILY AS NEEDED FOR ALLERGIES., Disp: 90 tablet, Rfl: 1    cholecalciferol (Vitamin D-3) 50 mcg (2,000 unit) capsule, Take 1 capsule (50 mcg) by mouth once daily., Disp: , Rfl:     cloNIDine (Catapres) 0.1 mg tablet, Take 1 tablet (0.1 mg) by mouth 3 times a day., Disp: 270 tablet, Rfl: 3    diclofenac sodium 1.5 % drops, Apply 4 drops topically 4 times a day as needed., Disp: , Rfl:     DULoxetine (Cymbalta) 60 mg DR capsule, Take 2 capsules (120 mg) by mouth once daily at bedtime., Disp: 180 capsule, Rfl: 1    erythromycin base (E-Mycin) 500 mg tablet, Take 2 tablets (1,000 mg) by mouth. ONE HOUR BEFORE THE DENTAL PROCEDURE, Disp: , Rfl:     fluticasone (Flonase) 50 mcg/actuation nasal spray, Administer 1 spray into affected nostril(s) 2 times a day., Disp: , Rfl:     fluticasone-umeclidin-vilanter (Trelegy Ellipta) 100-62.5-25 mcg blister with device, Inhale 1 puff early in the morning.., Disp: 3 each, Rfl: 2    gabapentin (Neurontin) 600 mg tablet, Take 2 tablets (1,200 mg) by mouth 3 times a day., Disp: , Rfl:     hydrALAZINE (Apresoline) 25 mg tablet, Take 1 tablet (25 mg) by mouth 3 times a day., Disp: 270 tablet, Rfl: 3    hydrOXYzine pamoate (Vistaril) 25 mg capsule, TAKE 1 CAPSULE (25 MG) BY MOUTH 3 TIMES A DAY AS NEEDED FOR ANXIETY., Disp: 30 capsule, Rfl: 1    isosorbide mononitrate ER (Imdur) 60 mg 24 hr tablet, Take 1 tablet (60 mg) by mouth once daily., Disp: 90 tablet, Rfl: 3    meclizine (Antivert) 12.5 mg tablet, TAKE 1 TABLET BY MOUTH THREE TIMES A DAY AS NEEDED, Disp: 30 tablet, Rfl: 1     "metoclopramide (Reglan) 10 mg tablet, Take 1 tablet (10 mg) by mouth twice a day., Disp: , Rfl:     mometasone (Nasonex) 50 mcg/actuation nasal spray, ADMINISTER 2 SPRAYS INTO EACH NOSTRIL 2 TIMES A DAY., Disp: 17 g, Rfl: 2    multivitamin (Multiple Vitamins) tablet, Take 1 tablet by mouth once daily., Disp: , Rfl:     neomycin-polymyxin-HC (Cortisporin) otic solution, Administer 3 drops into each ear. 3-4 TIMES DAILY., Disp: , Rfl:     nitroglycerin (Nitrostat) 0.3 mg SL tablet, Place 1 tablet (0.3 mg) under the tongue every 5 minutes if needed for chest pain (3 DOSES. IF CHEST PAIN CONTINUES, CALL 911)., Disp: , Rfl:     olopatadine (Pataday) 0.2 % ophthalmic solution, Administer 1 drop into affected eye(s) once daily as needed for allergies (itchy eyes)., Disp: 2.5 mL, Rfl: 2    ondansetron (Zofran) 4 mg tablet, Take 1 tablet (4 mg) by mouth every 8 hours if needed for nausea., Disp: , Rfl:     pantoprazole (ProtoNix) 20 mg EC tablet, TAKE 1 TABLET BY MOUTH EVERY DAY, Disp: 90 tablet, Rfl: 3    tiZANidine (Zanaflex) 4 mg tablet, TAKE 1.5 TABLETS (6 MG) BY MOUTH ONCE DAILY AT BEDTIME., Disp: 135 tablet, Rfl: 1    verapamil (Calan) 120 mg tablet, Take 1 tablet (120 mg) by mouth 3 times a day., Disp: 270 tablet, Rfl: 3  Allergies   Allergen Reactions    Buspirone Unknown    Cefazolin Swelling    Clindamycin Swelling, Hives and Itching    Donepezil Unknown    Erythromycin Hives    Ibuprofen Other     Raises Blood Pressure    Latex Itching, Hives and Swelling     breaks out    Levofloxacin Swelling     inflamed bowel    Nsaids (Non-Steroidal Anti-Inflammatory Drug) Unknown    Sulfa (Sulfonamide Antibiotics) Hives    Benadryl Allergy Decongestant Palpitations     Rapid heart rate    Penicillins Rash and Hives       Objective     /70   Pulse 95   Temp 36.1 °C (96.9 °F)   Ht 1.575 m (5' 2\")   Wt 91.2 kg (201 lb)   BMI 36.76 kg/m²     CONSTITUTIONAL:  No acute distress    SPINE:  Significant tightness of the " mid and upper cervical paraspinals.    MENTAL STATUS:  Awake, alert, fully oriented to self, place, and time, with present short-term memory, good awareness of recent events, normal attention span, concentration, and fund of knowledge.    SPEECH AND LANGUAGE:  Can name and repeat, follows all commands, has no dysarthria    CRANIAL NERVES:  II-Vision present, visual fields full to confrontational testing    III/IV/VI--EOMs are present in all directions.  Pupils are symmetrically reactive in dim light.  No ptosis.    V--Normal facial sensation.    VII--No facial asymmetry.    VIII--Hearing present to voice bilaterally.    IX/X--Symmetric soft palate rise.    XI--Normal trapezius power bilaterally.    XII--Tongue protrudes without deviation.    MOTOR:  Normal power, tone, and bulk in both arms and both legs.    SENSORY:  Normal pin sensation in both arms and both legs without distal-proximal gradient, asymmetry, or spinal sensory level.    COORDINATION:  Normal finger-to-nose and heel-to-shin testing in both arms and both legs.    REFLEXES are normal and symmetric at the biceps, triceps, brachioradialis, patella, and ankle.  The plantar responses are flexor.    GAIT is normal, without steppage, ataxia, shuffling, or spasticity.      Brice Jean Jr., M.D., FAAN

## 2024-04-04 NOTE — LETTER
April 4, 2024     Aaron Ruff MD  [address] [facility] [address]    Patient: Dilma John   YOB: 1955   Date of Visit: 4/4/2024       Dear Dr. Aaron Ruff MD:    Thank you for allowing me to continue in the neurological care of your patient, Dilma John. Below are my notes for this visit.  If you have questions, please do not hesitate to call me.       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  She is on two other medications used for migraine prophylaxis, but used for other items in this patient, and this also hasn't reduced the number of headaches.    PLAN:  Dexamethasone taper over 10 days from 10 mg to abort headache.  If not helpful, I will add an injectable CGRP inhibitor.  Her insurance covers Ajovy and Emgality.  I will see her in 3-4 months or prn, but I asked her to call in 2-3 weeks with progress on the headaches so I can proceed to the next step.      Brice Jean Jr., M.D., FAAN   ----------    I have personally reviewed the OARRS report for Dilma John   I have considered the risks of abuse, dependence, addiction and diversion.   Controlled Substance Agreement:   I have printed this form and reviewed each line item with the patient and the patient has verbalized understanding.   Date of the last Controlled Substance Agreement:  4/4/2024    Subjective    Dilma John is a 69 y.o. year old female here for follow-up.    She reports daily migraine headaches for the last six months, even though she has been on gabapentin 600 mg tid  She also has cervical and  occipital tightness, which is helped by tizanidine; worse since she fell backward in 7/2024, striking the back of the head.    HEADACHE HISTORY:    Headache type:  No aura.  Crown of head pounding with photophobia, phonophobia, nausea, but no emesis.    Number of headache days per week/month: 30 days a month  Number of months with headache frequency:  Six months     Abortive medications tried for migraine: ASA, acetaminophen, ibuprofen, naproxen, butalbital/APAP/caffeine     Prophylactic medications tried for migraine: gabapentin (currently taking, not helping), verapamil (currently taking, not helping), duloxetine (currently taking, not helping),       Past Medical History:   Diagnosis Date   • Chronic sinusitis, unspecified 04/26/2021    Sinusitis   • Essential (primary) hypertension 07/05/2022    Benign essential hypertension   • Personal history of other diseases of the nervous system and sense organs 12/30/2021    History of migraine headaches     Past Surgical History:   Procedure Laterality Date   • MR HEAD ANGIO WO IV CONTRAST  6/13/2013    MR HEAD ANGIO WO IV CONTRAST [place] [facility]   • OTHER SURGICAL HISTORY  04/17/2019    Hysterectomy   • OTHER SURGICAL HISTORY  04/17/2019    Uterine myomectomy   • OTHER SURGICAL HISTORY  04/17/2019    Tonsillectomy     Social History     Tobacco Use   • Smoking status: Never   • Smokeless tobacco: Never   Substance Use Topics   • Alcohol use: Not Currently     family history includes Arthritis in her mother; Asthma in an other family member; Diabetes in an other family member; Heart attack in an other family member; Hypertension in an other family member; Stroke in an other family member.    Current Outpatient Medications:   •  albuterol 2.5 mg /3 mL (0.083 %) nebulizer solution, Inhale 3 mL (2.5 mg) every 6 hours if needed., Disp: , Rfl:   •  albuterol 90 mcg/actuation inhaler, Inhale 2 puffs every 6 hours if needed for shortness of breath., Disp: , Rfl:    •  allopurinol (Zyloprim) 100 mg tablet, TAKE 1 TABLET BY MOUTH EVERY DAY, Disp: 90 tablet, Rfl: 2  •  aluminum hydrox-magnesium carb (Gaviscon Extra Strength) 160-105 mg tablet,chewable, Chew 1 tablet once daily at bedtime., Disp: 60 tablet, Rfl: 0  •  azelastine (Astelin) 137 mcg (0.1 %) nasal spray, Administer 2 sprays into affected nostril(s) 2 times a day., Disp: , Rfl:   •  benzonatate (Tessalon) 200 mg capsule, Take 1 capsule (200 mg) by mouth 3 times a day as needed for cough., Disp: , Rfl:   •  bumetanide (Bumex) 0.5 mg tablet, Take 1 tablet (0.5 mg) by mouth once daily., Disp: 90 tablet, Rfl: 3  •  busPIRone (Buspar) 15 mg tablet, TAKE 1/2 TABLET BY MOUTH TWICE A DAY, Disp: 90 tablet, Rfl: 1  •  butalbital-acetaminophen-caff -40 mg tablet, TAKE 1 TABLET BY MOUTH AT ONSET OF HEADACHE THEN UP TO EVERY 6 HRS AS NEEDED. MAX 3 TABS PER DAY, Disp: 20 tablet, Rfl: 3  •  cetirizine (ZyrTEC) 10 mg tablet, TAKE 1 TABLET (10 MG) BY MOUTH ONCE DAILY AS NEEDED FOR ALLERGIES., Disp: 90 tablet, Rfl: 1  •  cholecalciferol (Vitamin D-3) 50 mcg (2,000 unit) capsule, Take 1 capsule (50 mcg) by mouth once daily., Disp: , Rfl:   •  cloNIDine (Catapres) 0.1 mg tablet, Take 1 tablet (0.1 mg) by mouth 3 times a day., Disp: 270 tablet, Rfl: 3  •  diclofenac sodium 1.5 % drops, Apply 4 drops topically 4 times a day as needed., Disp: , Rfl:   •  DULoxetine (Cymbalta) 60 mg DR capsule, Take 2 capsules (120 mg) by mouth once daily at bedtime., Disp: 180 capsule, Rfl: 1  •  erythromycin base (E-Mycin) 500 mg tablet, Take 2 tablets (1,000 mg) by mouth. ONE HOUR BEFORE THE DENTAL PROCEDURE, Disp: , Rfl:   •  fluticasone (Flonase) 50 mcg/actuation nasal spray, Administer 1 spray into affected nostril(s) 2 times a day., Disp: , Rfl:   •  fluticasone-umeclidin-vilanter (Trelegy Ellipta) 100-62.5-25 mcg blister with device, Inhale 1 puff early in the morning.., Disp: 3 each, Rfl: 2  •  gabapentin (Neurontin) 600 mg tablet, Take 2  tablets (1,200 mg) by mouth 3 times a day., Disp: , Rfl:   •  hydrALAZINE (Apresoline) 25 mg tablet, Take 1 tablet (25 mg) by mouth 3 times a day., Disp: 270 tablet, Rfl: 3  •  hydrOXYzine pamoate (Vistaril) 25 mg capsule, TAKE 1 CAPSULE (25 MG) BY MOUTH 3 TIMES A DAY AS NEEDED FOR ANXIETY., Disp: 30 capsule, Rfl: 1  •  isosorbide mononitrate ER (Imdur) 60 mg 24 hr tablet, Take 1 tablet (60 mg) by mouth once daily., Disp: 90 tablet, Rfl: 3  •  meclizine (Antivert) 12.5 mg tablet, TAKE 1 TABLET BY MOUTH THREE TIMES A DAY AS NEEDED, Disp: 30 tablet, Rfl: 1  •  metoclopramide (Reglan) 10 mg tablet, Take 1 tablet (10 mg) by mouth twice a day., Disp: , Rfl:   •  mometasone (Nasonex) 50 mcg/actuation nasal spray, ADMINISTER 2 SPRAYS INTO EACH NOSTRIL 2 TIMES A DAY., Disp: 17 g, Rfl: 2  •  multivitamin (Multiple Vitamins) tablet, Take 1 tablet by mouth once daily., Disp: , Rfl:   •  neomycin-polymyxin-HC (Cortisporin) otic solution, Administer 3 drops into each ear. 3-4 TIMES DAILY., Disp: , Rfl:   •  nitroglycerin (Nitrostat) 0.3 mg SL tablet, Place 1 tablet (0.3 mg) under the tongue every 5 minutes if needed for chest pain (3 DOSES. IF CHEST PAIN CONTINUES, CALL 911)., Disp: , Rfl:   •  olopatadine (Pataday) 0.2 % ophthalmic solution, Administer 1 drop into affected eye(s) once daily as needed for allergies (itchy eyes)., Disp: 2.5 mL, Rfl: 2  •  ondansetron (Zofran) 4 mg tablet, Take 1 tablet (4 mg) by mouth every 8 hours if needed for nausea., Disp: , Rfl:   •  pantoprazole (ProtoNix) 20 mg EC tablet, TAKE 1 TABLET BY MOUTH EVERY DAY, Disp: 90 tablet, Rfl: 3  •  tiZANidine (Zanaflex) 4 mg tablet, TAKE 1.5 TABLETS (6 MG) BY MOUTH ONCE DAILY AT BEDTIME., Disp: 135 tablet, Rfl: 1  •  verapamil (Calan) 120 mg tablet, Take 1 tablet (120 mg) by mouth 3 times a day., Disp: 270 tablet, Rfl: 3  Allergies   Allergen Reactions   • Buspirone Unknown   • Cefazolin Swelling   • Clindamycin Swelling, Hives and Itching   • Donepezil  "Unknown   • Erythromycin Hives   • Ibuprofen Other     Raises Blood Pressure   • Latex Itching, Hives and Swelling     breaks out   • Levofloxacin Swelling     inflamed bowel   • Nsaids (Non-Steroidal Anti-Inflammatory Drug) Unknown   • Sulfa (Sulfonamide Antibiotics) Hives   • Benadryl Allergy Decongestant Palpitations     Rapid heart rate   • Penicillins Rash and Hives       Objective    /70   Pulse 95   Temp 36.1 °C (96.9 °F)   Ht 1.575 m (5' 2\")   Wt 91.2 kg (201 lb)   BMI 36.76 kg/m²     CONSTITUTIONAL:  No acute distress    SPINE:  Significant tightness of the mid and upper cervical paraspinals.    MENTAL STATUS:  Awake, alert, fully oriented to self, place, and time, with present short-term memory, good awareness of recent events, normal attention span, concentration, and fund of knowledge.    SPEECH AND LANGUAGE:  Can name and repeat, follows all commands, has no dysarthria    CRANIAL NERVES:  II-Vision present, visual fields full to confrontational testing    III/IV/VI--EOMs are present in all directions.  Pupils are symmetrically reactive in dim light.  No ptosis.    V--Normal facial sensation.    VII--No facial asymmetry.    VIII--Hearing present to voice bilaterally.    IX/X--Symmetric soft palate rise.    XI--Normal trapezius power bilaterally.    XII--Tongue protrudes without deviation.    MOTOR:  Normal power, tone, and bulk in both arms and both legs.    SENSORY:  Normal pin sensation in both arms and both legs without distal-proximal gradient, asymmetry, or spinal sensory level.    COORDINATION:  Normal finger-to-nose and heel-to-shin testing in both arms and both legs.    REFLEXES are normal and symmetric at the biceps, triceps, brachioradialis, patella, and ankle.  The plantar responses are flexor.    GAIT is normal, without steppage, ataxia, shuffling, or spasticity.      Brice Jean Jr., M.D., FAAN     "

## 2024-04-05 ENCOUNTER — TREATMENT (OUTPATIENT)
Dept: PHYSICAL THERAPY | Facility: CLINIC | Age: 69
End: 2024-04-05
Payer: MEDICARE

## 2024-04-05 DIAGNOSIS — Z96.652 HISTORY OF TOTAL KNEE ARTHROPLASTY, LEFT: ICD-10-CM

## 2024-04-05 DIAGNOSIS — Z96.651 HISTORY OF TOTAL KNEE ARTHROPLASTY, RIGHT: ICD-10-CM

## 2024-04-05 PROCEDURE — 97110 THERAPEUTIC EXERCISES: CPT | Mod: GP,CQ

## 2024-04-05 PROCEDURE — 97016 VASOPNEUMATIC DEVICE THERAPY: CPT | Mod: GP,CQ

## 2024-04-05 ASSESSMENT — PAIN SCALES - GENERAL: PAINLEVEL_OUTOF10: 7

## 2024-04-05 ASSESSMENT — PAIN - FUNCTIONAL ASSESSMENT: PAIN_FUNCTIONAL_ASSESSMENT: 0-10

## 2024-04-05 NOTE — PROGRESS NOTES
Physical Therapy  Physical Therapy Treatment    Patient Name: Dilma John  MRN: [id number]  Today's Date: 4/5/2024    Time Calculation  Start Time: 1312  Stop Time: 1401  Time Calculation (min): 49 min    Insurance:  Visit number: 4 of med nec  Authorization info: No Auth needed  Insurance Type: [employer] Advantage  Cert date start: 3/13/2024        Cert date end: 6/11/2024     Current Problem  1. History of total knee arthroplasty, left  Follow Up In Physical Therapy      2. History of total knee arthroplasty, right  Follow Up In Physical Therapy          Pain  Pain Assessment: 0-10  Pain Score: 7    Subjective:   Subjective   Patient reports walking a lot prior to treatment session due to having their father's medical care walking around for longer than normal.     HEP Compliance: Good    Objective:   Objective   Antalgic gait, decreased speed.    Treatments:     Exercise Sets/Reps Comments   Recumbent bike 5' Lvl 1.7 seat 6   Calve stretch 1'x2 Rockerboard on stairs   Hamstring stretch on stairs  1' Each leg   Hip flexor stretch on stairs  1' Each leg   LAQ 2x10 3#   SLR x10 Small ROM each leg  p!   Seated hip flexor marches 1'x2    PB roll outs trunk flexion stretch 1'    Seated HR  x10 5#   Vaso  10' Low both knees   Charges: TE x3 Vaso x1  Access Code: [email or web address]   Assessment:    Pt reported great stretching sensation during each stretch performed in today's treatment session. Pt was able to tolerate an small ROM SLR in supine with facial grimaces. Pt reported knee pain while performing quad set during SLR.     Plan:    Continue to progress hip flexor strengthening and ROM of hip flexion.     Dagoberto Ozuna, PTA

## 2024-04-08 ENCOUNTER — TELEPHONE (OUTPATIENT)
Dept: PRIMARY CARE | Facility: CLINIC | Age: 69
End: 2024-04-08

## 2024-04-08 ENCOUNTER — TREATMENT (OUTPATIENT)
Dept: PHYSICAL THERAPY | Facility: CLINIC | Age: 69
End: 2024-04-08
Payer: MEDICARE

## 2024-04-08 DIAGNOSIS — Z96.651 HISTORY OF TOTAL KNEE ARTHROPLASTY, RIGHT: ICD-10-CM

## 2024-04-08 DIAGNOSIS — Z96.652 HISTORY OF TOTAL KNEE ARTHROPLASTY, LEFT: ICD-10-CM

## 2024-04-08 DIAGNOSIS — R42 DIZZINESS AND GIDDINESS: ICD-10-CM

## 2024-04-08 PROCEDURE — 97113 AQUATIC THERAPY/EXERCISES: CPT | Mod: GP,CQ

## 2024-04-08 RX ORDER — MECLIZINE HCL 12.5 MG 12.5 MG/1
TABLET ORAL
Qty: 30 TABLET | Refills: 1 | Status: SHIPPED | OUTPATIENT
Start: 2024-04-08

## 2024-04-08 ASSESSMENT — PAIN SCALES - GENERAL: PAINLEVEL_OUTOF10: 6

## 2024-04-08 ASSESSMENT — PAIN - FUNCTIONAL ASSESSMENT: PAIN_FUNCTIONAL_ASSESSMENT: 0-10

## 2024-04-08 NOTE — PROGRESS NOTES
Physical Therapy  Physical Therapy Treatment    Patient Name: Dilma John  MRN: [id number]  Today's Date: 4/8/2024    Time Calculation  Start Time: 1315  Stop Time: 1355  Time Calculation (min): 40 min       Insurance:  Visit number: 5 of [facility]  Authorization info: No Auth needed  Insurance Type: [employer] Advantage  Cert date start: 3/13/2024        Cert date end: 6/11/2024           Current Problem  1. History of total knee arthroplasty, left  Follow Up In Physical Therapy      2. History of total knee arthroplasty, right  Follow Up In Physical Therapy          Pain  Pain Assessment: 0-10  Pain Score: 6    Subjective:   Subjective   Patient reports feeling off balance today and having difficulty with getting in and out of the pool.     HEP Compliance: Fair    Objective:   Objective   Double hand rail support required during exiting and entering pool.     Treatments:   Pool depth: 4 foot, temperature 92 degree    Exercise Sets/Reps Comments   Fwd/bwd walking 5'    Side stepping 5'    Mini squats  5'    HR/TR 5'    MIP 5'    Hip abduction  5'    Hip extension 5'    SLR in standing  5'      Assessment:    Pt was able to tolerate all exercises performed in this treatment session with no increase in pain. Pt ambulating speed in water was faster than in previous treatment sessions.     Plan:    Continue to progress LE strengthening exercises in NV.     Dagoberto Ozuna, PTA

## 2024-04-08 NOTE — TELEPHONE ENCOUNTER
Patient is asking for an Antivert refill states she's having teeth pulled tomorrow and would like this prescription

## 2024-04-10 ENCOUNTER — TREATMENT (OUTPATIENT)
Dept: PHYSICAL THERAPY | Facility: CLINIC | Age: 69
End: 2024-04-10
Payer: MEDICARE

## 2024-04-10 DIAGNOSIS — Z96.651 HISTORY OF TOTAL KNEE ARTHROPLASTY, RIGHT: ICD-10-CM

## 2024-04-10 DIAGNOSIS — Z96.652 HISTORY OF TOTAL KNEE ARTHROPLASTY, LEFT: ICD-10-CM

## 2024-04-10 PROCEDURE — 97110 THERAPEUTIC EXERCISES: CPT | Mod: GP,CQ

## 2024-04-10 PROCEDURE — 97016 VASOPNEUMATIC DEVICE THERAPY: CPT | Mod: GP,CQ

## 2024-04-10 ASSESSMENT — PAIN - FUNCTIONAL ASSESSMENT: PAIN_FUNCTIONAL_ASSESSMENT: 0-10

## 2024-04-10 ASSESSMENT — PAIN SCALES - GENERAL: PAINLEVEL_OUTOF10: 7

## 2024-04-10 NOTE — PROGRESS NOTES
Physical Therapy  Physical Therapy Treatment    Patient Name: Dilma John  MRN: [id number]  Today's Date: 4/10/2024    Time Calculation  Start Time: 1316  Stop Time: 1410  Time Calculation (min): 54 min    Insurance:  Visit number: 6 of med nec  Authorization info: No Auth needed  Insurance Type: [employer]vantage  Cert date start: 3/13/2024        Cert date end: 6/11/2024          Current Problem  1. History of total knee arthroplasty, left  Follow Up In Physical Therapy      2. History of total knee arthroplasty, right  Follow Up In Physical Therapy        Precautions  STEADI Fall Risk Score (The score of 4 or more indicates an increased risk of falling): 9    Pain  Pain Assessment: 0-10  Pain Score: 7    Subjective:   Subjective   Patient reports feeling same as normal and that right knee is worse than left in today's treatment session.     HEP Compliance: Good    Objective:   Objective   Antalgic gait post SS stretches.   Decreased gait speed in clinic.     Treatments:     Exercise Sets/Reps Comments   Nu Step  5' Seat height 7 lvl 3    Calve stretch 1'x2    Hamstring stretch 1' each leg    LAQ x20 2#   Seated march  1'x2 4#   Hip flexor stretch on bed in supine  1'  Each leg   Leg press  X15/x15 50# 75#   Hamstring curls  2x10 30#   Vaso 10 low   Charges Tex3 Vasox1    Assessment:    Pt reported increase knee pain while performing seated hip marches with weights. Pt was able to tolerate newly added machine exercises in today's visit with no increase in pain.     Plan:    Attempt SLR in NV with an increase in ROM. In next aquatic session attempt hip flexion exercises.     Dagoberto Ozuna, PTA

## 2024-04-15 ENCOUNTER — TREATMENT (OUTPATIENT)
Dept: PHYSICAL THERAPY | Facility: CLINIC | Age: 69
End: 2024-04-15
Payer: MEDICARE

## 2024-04-15 DIAGNOSIS — M48.061 FORAMINAL STENOSIS OF LUMBAR REGION: ICD-10-CM

## 2024-04-15 DIAGNOSIS — Z96.652 HISTORY OF TOTAL KNEE ARTHROPLASTY, LEFT: Primary | ICD-10-CM

## 2024-04-15 DIAGNOSIS — Z96.651 HISTORY OF TOTAL KNEE ARTHROPLASTY, RIGHT: ICD-10-CM

## 2024-04-15 PROCEDURE — 97113 AQUATIC THERAPY/EXERCISES: CPT | Mod: GP

## 2024-04-15 ASSESSMENT — PAIN SCALES - GENERAL: PAINLEVEL_OUTOF10: 7

## 2024-04-15 NOTE — PROGRESS NOTES
Physical Therapy  Physical Therapy Orthopedic Progress Note and Treatment Note    Patient Name: Dilma John  MRN: [id number]  Today's Date: 4/15/2024  Time Calculation  Start Time: 1317  Stop Time: 1403  Time Calculation (min): 46 min    Insurance:  Visit number: 7 of [place]  Authorization info: No Auth  Insurance Type: [employer]vantage Medicare Certification Period: Beginning: 3/13/2024  Endin2024   Onset date: 3/1/2022     General:  Reason for visit: Bilateral Knee Pain  Referred by: Luis Ott    Reason for visit: Foraminal Stenosis of Lumbar Region  Referred by: Meena Barraza MD    Current Problem  1. History of total knee arthroplasty, left  Follow Up In Physical Therapy      2. History of total knee arthroplasty, right  Follow Up In Physical Therapy      3. Foraminal stenosis of lumbar region            Precautions: CHRIS Fall Risk Score (The score of 4 or more indicates an increased risk of falling): 9       Subjective:     Patient reports that the aquatic therapy has helped with the left knee symptoms.  The burning and aching in the right knee has persisted. Notes that she still stumbles when ambulating.  Uses cane when taking long walks, but tries to walk without it.      Received new PT referral for lumbar foraminal stenosis.  Will be getting an lumbar KATTY on 24. Neurologist increased dosage for the muscle relaxant.    Current Condition:   same - worse - better     Pain  Current: 2/10 L knee, 6/10 R knee, 7/10 low back with radiation down the RLE  At worst: 9/10 RLE    Location: Bilateral knees, Back and RLE  Description: Ache, stiffness, burning, radiating    Exacerbating Factors: bending forward, laying on the right side, prolonged standing, sit-stand     Functional Limitations: Walking > 10 min    Self Reported Function (0-100%) = 70%  - 100% being back to PLOF    Performing HEP?: Yes      Objective:   Posture: Obesity     Gait: No assistive device, antalgic, decreased  stride length, decreased gait speed, lateral trunk lean     Observation: Well healed and closed TKA incisional scars                                            ROM  Lumbar AROM (%)    Flexion: 100%, pain into RLE, pain with return to neutral  Extension: 60% ERP  (R) Side Bend: 100%  (L) Side Bend: 100%  (R) Rotation: 70%, mild LOB  (L) Rotation: 70%, mild LOB     (+) McCool sign         Goals: Updated 4/15/2024  Active       PT Problem       PT Goal 1       Start:  03/13/24    Expected End:  06/11/24       Bilateral Knee ROM 0-120 and without pain > 2/10 by week 8.  Lumbar AROM WFL without production of RLE symptoms by week 8.    LE strength measures improved to at least 4+/5 by week 12.  Walk 20 minutes with minimal gait deviation and minimal difficulty by week 12.  Reciprocal ascent/descent of stairs with use of railing by week 12.  5x sit-stand time reduced by at least 10 seconds to demonstrated reduced falls risk and improved LE functional strength by week 12.   LEFS improved by at least 8 points by week 12.   Patient will rate pain < 3/10 at worst to improve ADL tolerance by week 12.  Independent with home exercise program for symptom reduction and functional gains by week 12.         PT Goal 2       Start:  03/13/24    Expected End:  06/11/24       Demonstrate ability to  object from the ground without loss of balance by week 6.    Demonstrate ambulation over and around obstacles in path without loss of balance by week 12.  Able to safely negotiate curbs with good control and without fear of loss of balance by week 12.  Maintain single leg balance with minimal support x 5 seconds by week 12.                 Treatment Performed:    Water temp:  92 degrees    4 Foot Water Depth:   Ambulation with focus on upright posture:  Forward/backward  5'  Sidestepping 5'  Standing exercises at bar with abdominals engaged:  MIP: 4'  Alt Hip Abd/Add  4'  A/P LE Swings 3' R/L  LE circles CW/CCW 3' R/L  HS curls 4'  R/L  Heel/toe raises 3'    3 Foot Water Depth:   Mini squats 3'  Hip flexor stretch on step 1' R/L  Hamstring stretch on step 1' R/L       Personalized Home Exercise Program:   Access Code: [email or web address]  URL: [email or web address]  Date: 03/13/2024  Prepared by: Nina Arnold     Exercises  - Supine Transversus Abdominis Bracing - Hands on Ground  - 2 x daily - 7 x weekly - 20 reps - 5 seconds hold  - Hooklying Heel Slide  - 2 x daily - 7 x weekly - 20 reps  - Supine March  - 1 x daily - 7 x weekly - 3 sets - 10 reps  - Supine Bridge  - 1 x daily - 7 x weekly - 3 sets - 10 reps - 5 seconds hold  - Small Range Straight Leg Raise  - 1 x daily - 7 x weekly - 3 sets - 10 reps  - Supine Hip Adduction Isometric with Ball  - 1 x daily - 7 x weekly - 20 reps - 5 seconds hold  - Sit to Stand with Armchair  - 1 x daily - 7 x weekly - 2 sets - 10 reps  - Standing Heel Raise with Support  - 1 x daily - 7 x weekly - 3 sets - 10 reps         Assessment:   Continues to struggle with chronic knee and back symptoms, but does note some relief with aquatic therapy exercises. Demonstrates reproduction of RLE radicular symptoms with lumbar ROM testing with demonstration of lumbar instability.  Would benefit from continued physical therapy to work on core stabilization, LE strengthening, hip and knee ROM, and gait and balance training for improvement in symptoms and recovery of function.       Plan of Care: Updated 4/15/2024  Continue with a combination of aquatic and land based therapy to address bilateral knee pain, lumbar radicular symptoms, and balance/gait deficits.      Planned Interventions include: therapeutic exercise, self-care home management, manual therapy, therapeutic activities, gait training, neuromuscular coordination, aquatic therapy  Frequency: 1-2x/week  Duration: 6-8 weeks    Nina Arnold, PT

## 2024-04-17 ENCOUNTER — TREATMENT (OUTPATIENT)
Dept: PHYSICAL THERAPY | Facility: CLINIC | Age: 69
End: 2024-04-17
Payer: MEDICARE

## 2024-04-17 DIAGNOSIS — Z96.651 HISTORY OF TOTAL KNEE ARTHROPLASTY, RIGHT: ICD-10-CM

## 2024-04-17 DIAGNOSIS — Z96.652 HISTORY OF TOTAL KNEE ARTHROPLASTY, LEFT: ICD-10-CM

## 2024-04-17 PROCEDURE — 97140 MANUAL THERAPY 1/> REGIONS: CPT | Mod: GP,CQ

## 2024-04-17 PROCEDURE — 97110 THERAPEUTIC EXERCISES: CPT | Mod: GP,CQ

## 2024-04-17 ASSESSMENT — PAIN SCALES - GENERAL: PAINLEVEL_OUTOF10: 6

## 2024-04-17 ASSESSMENT — PAIN - FUNCTIONAL ASSESSMENT: PAIN_FUNCTIONAL_ASSESSMENT: 0-10

## 2024-04-17 NOTE — PROGRESS NOTES
"Physical Therapy  Physical Therapy Treatment    Patient Name: Dilma John  MRN: [id number]  Today's Date: 4/17/2024    Time Calculation  Start Time: 1316  Stop Time: 1411  Time Calculation (min): 55 min    Insurance:  Visit number: 8 of med nec  Authorization info: No Auth needed  Insurance Type: [employer]vantage  Cert date start: 3/13/2024        Cert date end: 6/11/2024         Current Problem  1. History of total knee arthroplasty, left  Follow Up In Physical Therapy      2. History of total knee arthroplasty, right  Follow Up In Physical Therapy          Precautions  Precautions  STEADI Fall Risk Score (The score of 4 or more indicates an increased risk of falling): 9    Pain  Pain Assessment: 0-10  Pain Score: 6    Subjective:   Subjective   Patient reports feeling pretty good having to walk a lot at the [facility] while care taking for their father. Pt reports difficulty with SLR at home.     HEP Compliance: Fair    Objective:   Objective   Antalgic gait with decreased gait speed    Treatments:     Exercise Sets/Reps Comments   Recumbent biker 5'    Calve stretch  1.5' x2    Hamstring stretch on stairs 1    Heel raises  15x    Supine hooklying ball adduction 5\"x15    LTR  2'    SLR x4 Attempted each leg    Hip abduction/adduction cybex machine 2x15  40#   Spinal erectors  10'     Charges: TE x3 Manual x1    Assessment:    Pt reported minor lower back pain relief post manual strumming on bilateral spinal erectors. SLR's with proper core bracing cues still increased knee and back pain. Post 4 reps pt reported nerve numbness in RLE knee that went away post exercise.     Plan:    Continue to progress LE strengthening and balance in NV. Attempt to perform BOSU ball lunges.     Dagoberto Ozuna PTA    "

## 2024-04-19 ENCOUNTER — TELEPHONE (OUTPATIENT)
Dept: NEUROLOGY | Facility: CLINIC | Age: 69
End: 2024-04-19
Payer: MEDICARE

## 2024-04-19 DIAGNOSIS — G43.719 INTRACTABLE CHRONIC MIGRAINE WITHOUT AURA AND WITHOUT STATUS MIGRAINOSUS: Primary | ICD-10-CM

## 2024-04-19 NOTE — TELEPHONE ENCOUNTER
Finished prednisone two days ago.  Headaches returned last night  Advised to use butalbital/APAP/caffeine as she has CKD.  Will order CGRP inhibitor

## 2024-04-22 ENCOUNTER — DOCUMENTATION (OUTPATIENT)
Dept: PHYSICAL THERAPY | Facility: CLINIC | Age: 69
End: 2024-04-22
Payer: MEDICARE

## 2024-04-22 ENCOUNTER — SPECIALTY PHARMACY (OUTPATIENT)
Dept: PHARMACY | Facility: CLINIC | Age: 69
End: 2024-04-22

## 2024-04-22 NOTE — PROGRESS NOTES
Therapy Communication Note    Patient Name: Dilma John  MRN: [id number]  Today's Date: 4/22/2024     Discipline: Physical Therapy    Missed Type: No Show    Comment: Called and spoke with patient. She reported missing today due to not feeling well.

## 2024-04-24 ENCOUNTER — TREATMENT (OUTPATIENT)
Dept: PHYSICAL THERAPY | Facility: CLINIC | Age: 69
End: 2024-04-24
Payer: MEDICARE

## 2024-04-24 DIAGNOSIS — Z96.652 HISTORY OF TOTAL KNEE ARTHROPLASTY, LEFT: Primary | ICD-10-CM

## 2024-04-24 DIAGNOSIS — M48.061 FORAMINAL STENOSIS OF LUMBAR REGION: ICD-10-CM

## 2024-04-24 DIAGNOSIS — Z96.651 HISTORY OF TOTAL KNEE ARTHROPLASTY, RIGHT: ICD-10-CM

## 2024-04-24 PROCEDURE — 97110 THERAPEUTIC EXERCISES: CPT | Mod: GP

## 2024-04-24 NOTE — PROGRESS NOTES
"  Physical Therapy  Physical Therapy Treatment Note    Patient Name: Dilma John  MRN: [id number]  Today's Date: 2024  Time Calculation  Start Time: 1320  Stop Time: 1359  Time Calculation (min): 39 min    Insurance:  Visit number: 9 [facility]  Authorization info: No Auth  Insurance Type: [employer]vantage   Medicare Certification Period: Beginning: 3/13/2024  Endin2024   Onset date: 3/1/2022     General:  Reason for visit: Bilateral Knee Pain  Referred by: Luis Ott    Reason for visit: Foraminal Stenosis of Lumbar Region  Referred by: Meena Barraza MD    Current Problem  1. History of total knee arthroplasty, left  Follow Up In Physical Therapy      2. History of total knee arthroplasty, right  Follow Up In Physical Therapy      3. Foraminal stenosis of lumbar region                Precautions: CHRIS Fall Risk Score (The score of 4 or more indicates an increased risk of falling): 9       Subjective:     Patient reports that she is sore in her knees after mowing the grass and climbing 3 flights of stairs yesterday.      Will be getting an lumbar KATTY on 24. Has a hard time lying on R side at night.  Neurologist increased dosage for the muscle relaxant.    Pain  Current: 7/10     Self Reported Function (0-100%) = 70%  - 100% being back to PLOF    Performing HEP?: Yes      Objective:   Posture: Obesity     Gait: No assistive device, antalgic, decreased stride length, decreased gait speed, lateral trunk lean     Observation: Well healed and closed TKA incisional scars                                            ROM  Lumbar AROM (%)    Flexion: 100%, pain into RLE, pain with return to neutral  Extension: 60% ERP  (R) Side Bend: 100%  (L) Side Bend: 100%  (R) Rotation: 70%, mild LOB  (L) Rotation: 70%, mild LOB     (+) Fabricio sign         Treatment Performed:      Therapeutic Exercise:    39 min  R. Stepper L4 5'  Adductor squeeze 5\" hold x 15  Beginner bridge 5\" hold 2x10  SLR 2x10 " "R/L  Clamshells 3x10 R/L  Sit to stand 2x10  Leg Press 70# 3x10  Hip abduction 25# 3x10  Palof press 5# 45\"x1 each direction  Step-ups 6\" 15x R/L      Personalized Home Exercise Program:   Access Code: [email or web address]  URL: [email or web address]  Date: 03/13/2024  Prepared by: Nina Anrold     Exercises  - Supine Transversus Abdominis Bracing - Hands on Ground  - 2 x daily - 7 x weekly - 20 reps - 5 seconds hold  - Hooklying Heel Slide  - 2 x daily - 7 x weekly - 20 reps  - Supine March  - 1 x daily - 7 x weekly - 3 sets - 10 reps  - Supine Bridge  - 1 x daily - 7 x weekly - 3 sets - 10 reps - 5 seconds hold  - Small Range Straight Leg Raise  - 1 x daily - 7 x weekly - 3 sets - 10 reps  - Supine Hip Adduction Isometric with Ball  - 1 x daily - 7 x weekly - 20 reps - 5 seconds hold  - Sit to Stand with Armchair  - 1 x daily - 7 x weekly - 2 sets - 10 reps  - Standing Heel Raise with Support  - 1 x daily - 7 x weekly - 3 sets - 10 reps         Assessment:   Patient is notably deconditioned.  She moves slowly and easily fatigues with exercises.  Continues to struggle with chronic knee and back symptoms. Would benefit from continued physical therapy to work on core stabilization, LE strengthening, hip and knee ROM, and gait and balance training for improvement in symptoms and recovery of function.       Plan:  Continue with a combination of aquatic and land based therapy to address bilateral knee pain, lumbar radicular symptoms, and balance/gait deficits.        Nina Arnold, PT   "

## 2024-04-26 ENCOUNTER — HOSPITAL ENCOUNTER (OUTPATIENT)
Dept: RADIOLOGY | Facility: HOSPITAL | Age: 69
Discharge: HOME | End: 2024-04-26
Payer: MEDICARE

## 2024-04-26 VITALS
SYSTOLIC BLOOD PRESSURE: 129 MMHG | RESPIRATION RATE: 18 BRPM | WEIGHT: 189 LBS | TEMPERATURE: 96.5 F | HEART RATE: 58 BPM | HEIGHT: 62 IN | OXYGEN SATURATION: 96 % | DIASTOLIC BLOOD PRESSURE: 83 MMHG | BODY MASS INDEX: 34.78 KG/M2

## 2024-04-26 DIAGNOSIS — M48.061 FORAMINAL STENOSIS OF LUMBAR REGION: ICD-10-CM

## 2024-04-26 PROCEDURE — 64483 NJX AA&/STRD TFRM EPI L/S 1: CPT | Mod: RT | Performed by: ANESTHESIOLOGY

## 2024-04-26 PROCEDURE — 2500000004 HC RX 250 GENERAL PHARMACY W/ HCPCS (ALT 636 FOR OP/ED): Performed by: ANESTHESIOLOGY

## 2024-04-26 PROCEDURE — 2720000007 HC OR 272 NO HCPCS

## 2024-04-26 PROCEDURE — 64483 NJX AA&/STRD TFRM EPI L/S 1: CPT | Performed by: ANESTHESIOLOGY

## 2024-04-26 RX ORDER — MIDAZOLAM HYDROCHLORIDE 1 MG/ML
INJECTION INTRAMUSCULAR; INTRAVENOUS
Status: COMPLETED | OUTPATIENT
Start: 2024-04-26 | End: 2024-04-26

## 2024-04-26 RX ADMIN — MIDAZOLAM HYDROCHLORIDE 0.5 MG: 1 INJECTION INTRAMUSCULAR; INTRAVENOUS at 10:54

## 2024-04-26 ASSESSMENT — PAIN - FUNCTIONAL ASSESSMENT
PAIN_FUNCTIONAL_ASSESSMENT: 0-10
PAIN_FUNCTIONAL_ASSESSMENT: 0-10

## 2024-04-26 ASSESSMENT — PAIN SCALES - GENERAL
PAINLEVEL_OUTOF10: 5 - MODERATE PAIN
PAINLEVEL_OUTOF10: 0 - NO PAIN
PAINLEVEL_OUTOF10: 7

## 2024-04-26 NOTE — H&P
History Of Present Illness  Dilma John is a 69 y.o. female presenting with right-sided radicular pain.     Past Medical History  Past Medical History:   Diagnosis Date    Chronic sinusitis, unspecified 04/26/2021    Sinusitis    Essential (primary) hypertension 07/05/2022    Benign essential hypertension    Personal history of other diseases of the nervous system and sense organs 12/30/2021    History of migraine headaches       Surgical History  Past Surgical History:   Procedure Laterality Date    MR HEAD ANGIO WO IV CONTRAST  6/13/2013    MR HEAD ANGIO WO IV CONTRAST 6/13/2013 [facility]    OTHER SURGICAL HISTORY  04/17/2019    Hysterectomy    OTHER SURGICAL HISTORY  04/17/2019    Uterine myomectomy    OTHER SURGICAL HISTORY  04/17/2019    Tonsillectomy        Social History  She reports that she has never smoked. She has never used smokeless tobacco. She reports that she does not currently use alcohol. She reports that she does not use drugs.    Family History  Family History   Problem Relation Name Age of Onset    Arthritis Mother      Asthma Other      Diabetes Other      Hypertension Other      Heart attack Other      Stroke Other          Allergies  Buspirone, Cefazolin, Clindamycin, Donepezil, Erythromycin, Ibuprofen, Latex, Levofloxacin, Nsaids (non-steroidal anti-inflammatory drug), Sulfa (sulfonamide antibiotics), Benadryl allergy decongestant, and Penicillins    Review of Systems   13 point ROS done and negative except for the above.   Physical Exam     General: NAD, well nourished   Eyes: Non-icteric sclera, EOMI  Ears, Nose, Mouth, and Throat: External ears and nose appear to be without deformity or rash. No lesions or masses noted. Hearing is grossly intact.   Neck: Trachea midline  Respiratory: Nonlabored breathing   Cardiovascular: No JVD  Skin: No rashes or open lesions/ulcers identified on skin.    Last Recorded Vitals  Blood pressure 130/83, pulse 62, temperature 35.8 °C (96.4 °F),  "resp. rate 16, height 1.575 m (5' 2\"), weight 85.7 kg (189 lb), SpO2 95%.    Relevant Results           Assessment/Plan   Problem List Items Addressed This Visit             ICD-10-CM       Neuro    Foraminal stenosis of lumbar region M48.061    Relevant Orders    FL pain management    Transforaminal       Plan for right-sided L5 transforaminal epidural injection    Risks, benefits, alternatives to the procedure were discussed in detail and patient was amenable to proceeding.    Manan Dueñas MD    "

## 2024-04-26 NOTE — PROCEDURES
Preoperative diagnosis:  Lumbar radiculitis  Postoperative diagnosis:  Lumbar radiculitis  Procedure: Right-sided L5 lumbar transforaminal epidural steroid injection under fluoroscopic guidance with methylprednisolone/postoperative diagnosis: Right-sided lumbar radiculitis  Surgeon: Meena Barraza  Assistant:  Fellow, Neal Dueñas  Anesthesia: Local, IV sedation    Complications: Apparently none    Clinical note: Ms. John is a 69-year-old female with a history of low back and right leg pain who is here today for a selective nerve root block.  She has had injections with methylprednisolone before which gave her significant and lasting relief.    Procedure note: The patient was met in the preoperative holding area after risks benefits and alternatives to procedure were discussed with the patient, informed consent was obtained. Patient brought back to the procedure room and placed in the prone position on the fluoroscopy table. Area over the back was exposed, prepped, draped, in the usual sterile fashion.  Skin and subcutaneous tissues to the neuroforamen was anesthetized using 0.5% lidocaine.  A single 120 mm 22-gauge Sprotte needle was inserted in the skin and advanced into the foramen. Needle tip position was confirmed in AP oblique and lateral view.  Contrast was injected which showed some spread just down to the epidural space, just lateral.  The needle was placed slightly more medially and ventrally at which time there was appropriate epidural spread, no intravascular or intrathecal uptake. A total of 2 mL of 0.5% lidocaine mixed with 40 mg of methylprednisolone was injected.  The needle was then removed, bandage applied, patient tolerated the procedure well with no immediate complications.

## 2024-05-02 ENCOUNTER — TREATMENT (OUTPATIENT)
Dept: PHYSICAL THERAPY | Facility: CLINIC | Age: 69
End: 2024-05-02
Payer: MEDICARE

## 2024-05-02 DIAGNOSIS — Z96.652 HISTORY OF TOTAL KNEE ARTHROPLASTY, LEFT: ICD-10-CM

## 2024-05-02 DIAGNOSIS — Z96.651 HISTORY OF TOTAL KNEE ARTHROPLASTY, RIGHT: ICD-10-CM

## 2024-05-02 PROCEDURE — 97110 THERAPEUTIC EXERCISES: CPT | Mod: GP,CQ

## 2024-05-02 ASSESSMENT — PAIN SCALES - GENERAL: PAINLEVEL_OUTOF10: 4

## 2024-05-02 ASSESSMENT — PAIN - FUNCTIONAL ASSESSMENT: PAIN_FUNCTIONAL_ASSESSMENT: 0-10

## 2024-05-02 NOTE — PROGRESS NOTES
"  Physical Therapy  Physical Therapy Treatment Note    Patient Name: Dilma John  MRN: [id number]  Today's Date: 2024  Time Calculation  Start Time: 905  Stop Time: 944  Time Calculation (min): 39 min    Insurance:  Visit number: 10 [facility]  Authorization info: No Auth  Insurance Type: [employer] Advantage   Medicare Certification Period: Beginning: 3/13/2024  Endin2024   Onset date: 3/1/2022     General:  Reason for visit: Bilateral Knee Pain  Referred by: Luis Ott    Reason for visit: Foraminal Stenosis of Lumbar Region  Referred by: Meena Barraza MD    Current Problem  1. History of total knee arthroplasty, left  Follow Up In Physical Therapy      2. History of total knee arthroplasty, right  Follow Up In Physical Therapy              Precautions: STEADI Fall Risk Score (The score of 4 or more indicates an increased risk of falling): 9       Subjective:     Patient reports that weather being nicer is helping the knees and that the exercises are helping as well.     Pain  Current: 4/10       Performing HEP?: Yes      Objective:     Decreased gait speed and stride length  Log roll for supine to sit    Treatment Performed:      Therapeutic Exercise:    39 min  R. Stepper L4 5'  Calve stretch rocker board 1'  Hamstring stretch Each leg 1'  LTR 2'  BKFO supine 3x10  Beginner bridge 5\" hold 2x10  Leg press 80# 4x10  BOSU lunges 2x10  Side steps on 6 inch step x20           Personalized Home Exercise Program:   Access Code: [email or web address]  URL: [email or web address]  Date: 2024  Prepared by: Nina Arnold     Exercises  - Supine Transversus Abdominis Bracing - Hands on Ground  - 2 x daily - 7 x weekly - 20 reps - 5 seconds hold  - Hooklying Heel Slide  - 2 x daily - 7 x weekly - 20 reps  - Supine March  - 1 x daily - 7 x weekly - 3 sets - 10 reps  - Supine Bridge  - 1 x daily - 7 x weekly - 3 sets - 10 reps - 5 seconds hold  - Small Range Straight Leg Raise  - 1 x " daily - 7 x weekly - 3 sets - 10 reps  - Supine Hip Adduction Isometric with Ball  - 1 x daily - 7 x weekly - 20 reps - 5 seconds hold  - Sit to Stand with Armchair  - 1 x daily - 7 x weekly - 2 sets - 10 reps  - Standing Heel Raise with Support  - 1 x daily - 7 x weekly - 3 sets - 10 reps         Assessment:     Pt was given verbal cueing to decrease knee valgus while performing lateral step ups. Pt tolerated treatment session fairly. Pt had difficulty with BOSU ball lunges and required UE support.       Plan:  Continue to progress LE strengthening within tolerance of pt pain.     Dagoberto Ozuna, PTA

## 2024-05-07 ENCOUNTER — SPECIALTY PHARMACY (OUTPATIENT)
Dept: PHARMACY | Facility: CLINIC | Age: 69
End: 2024-05-07

## 2024-05-07 ENCOUNTER — APPOINTMENT (OUTPATIENT)
Dept: PHYSICAL THERAPY | Facility: CLINIC | Age: 69
End: 2024-05-07
Payer: MEDICARE

## 2024-05-07 PROCEDURE — RXMED WILLOW AMBULATORY MEDICATION CHARGE

## 2024-05-10 ENCOUNTER — PHARMACY VISIT (OUTPATIENT)
Dept: PHARMACY | Facility: CLINIC | Age: 69
End: 2024-05-10
Payer: COMMERCIAL

## 2024-05-13 ENCOUNTER — TREATMENT (OUTPATIENT)
Dept: PHYSICAL THERAPY | Facility: CLINIC | Age: 69
End: 2024-05-13
Payer: MEDICARE

## 2024-05-13 DIAGNOSIS — Z96.651 HISTORY OF TOTAL KNEE ARTHROPLASTY, RIGHT: ICD-10-CM

## 2024-05-13 DIAGNOSIS — M48.061 FORAMINAL STENOSIS OF LUMBAR REGION: Primary | ICD-10-CM

## 2024-05-13 DIAGNOSIS — Z96.652 HISTORY OF TOTAL KNEE ARTHROPLASTY, LEFT: ICD-10-CM

## 2024-05-13 PROCEDURE — 97110 THERAPEUTIC EXERCISES: CPT | Mod: GP

## 2024-05-13 NOTE — PROGRESS NOTES
"  Physical Therapy  Physical Therapy Treatment Note    Patient Name: Dilma John  MRN: [id number]  Today's Date: 2024  Time Calculation  Start Time: 0836  Stop Time: 916  Time Calculation (min): 40 min    Insurance:  Visit number: [facility] [place]  Authorization info: No Auth  Insurance Type: [employer] Advantage   Medicare Certification Period: Beginning: 3/13/2024  Endin2024   Onset date: 3/1/2022     General:  Reason for visit: Bilateral Knee Pain  Referred by: Luis Ott    Reason for visit: Foraminal Stenosis of Lumbar Region  Referred by: Meena Barraza MD    Current Problem  1. Foraminal stenosis of lumbar region        2. History of total knee arthroplasty, left  Follow Up In Physical Therapy      3. History of total knee arthroplasty, right  Follow Up In Physical Therapy                Precautions: CHRIS Fall Risk Score (The score of 4 or more indicates an increased risk of falling): 9       Subjective:     Patient reports that she is achy.  Attributes it to rainy weather.  Had KATTY, but does not see any improvement.  Still has pain when turning onto the R side while lying and with prolonged standing.      Pain  Current: 6/10 in the back, 5/10 in the knees     Performing HEP?: Yes.  Notes that she gets calf cramps after heel raises.       Objective:   Posture: Obesity     Gait: No assistive device, antalgic, decreased stride length, decreased gait speed, lateral trunk lean     Observation: Well healed and closed TKA incisional scars                                            ROM  Lumbar AROM (%)    Flexion: 100%, pain into RLE, pain with return to neutral  Extension: 60% ERP  (R) Side Bend: 100%  (L) Side Bend: 100%  (R) Rotation: 70%, mild LOB  (L) Rotation: 70%, mild LOB     (+) [place] sign         Treatment Performed:    Therapeutic Exercise:    40 min  R. Stepper L4 5'  Bilateral calf raises 3x10  Incline calf stretch 1'  Step-ups 6\" 15x R/L  Adductor squeeze 5\" hold x 15  Beginner " "bridge 5\" hold 3x10  SLR 3x10 R/L  Clamshells 3x10 R/L  Sit to stand 2x10  Leg Press 70# 3x10  Hip abduction 40# 3x10  Palof press 5# 60\"x1 each direction      Personalized Home Exercise Program:   Access Code: [email or web address]  URL: [email or web address]  Date: 03/13/2024  Prepared by: Nina Arnold     Exercises  - Supine Transversus Abdominis Bracing - Hands on Ground  - 2 x daily - 7 x weekly - 20 reps - 5 seconds hold  - Hooklying Heel Slide  - 2 x daily - 7 x weekly - 20 reps  - Supine March  - 1 x daily - 7 x weekly - 3 sets - 10 reps  - Supine Bridge  - 1 x daily - 7 x weekly - 3 sets - 10 reps - 5 seconds hold  - Small Range Straight Leg Raise  - 1 x daily - 7 x weekly - 3 sets - 10 reps  - Supine Hip Adduction Isometric with Ball  - 1 x daily - 7 x weekly - 20 reps - 5 seconds hold  - Sit to Stand with Armchair  - 1 x daily - 7 x weekly - 2 sets - 10 reps  - Standing Heel Raise with Support  - 1 x daily - 7 x weekly - 3 sets - 10 reps         Assessment:   Patient is notably deconditioned and continues to fatigue with exercises.  All exercises performed slowly.  Soreness/tightness following calf raises likely due to weak calf musculature.  Encouraged to continue to to exercises with good consistency for strength and functional gains.      Plan:  Continue with a combination of aquatic and land based therapy to address bilateral knee pain, lumbar radicular symptoms, and balance/gait deficits.        Nina Arnold, PT   "

## 2024-05-15 ENCOUNTER — TREATMENT (OUTPATIENT)
Dept: PHYSICAL THERAPY | Facility: CLINIC | Age: 69
End: 2024-05-15
Payer: MEDICARE

## 2024-05-15 DIAGNOSIS — Z96.652 HISTORY OF TOTAL KNEE ARTHROPLASTY, LEFT: ICD-10-CM

## 2024-05-15 DIAGNOSIS — Z96.651 HISTORY OF TOTAL KNEE ARTHROPLASTY, RIGHT: ICD-10-CM

## 2024-05-15 PROCEDURE — 97110 THERAPEUTIC EXERCISES: CPT | Mod: GP

## 2024-05-15 NOTE — PROGRESS NOTES
"  Physical Therapy  Physical Therapy Treatment Note    Patient Name: Dilma John  MRN: [id number]  Today's Date: 5/15/2024  Time Calculation  Start Time: 1456  Stop Time: 1536  Time Calculation (min): 40 min    Insurance:  Visit number: 12 [facility]  Authorization info: No Auth  Insurance Type: [employer] Advantage   Medicare Certification Period: Beginning: 3/13/2024  Endin2024   Onset date: 3/1/2022     General:  Reason for visit: Bilateral Knee Pain  Referred by: Luis Ott    Reason for visit: Foraminal Stenosis of Lumbar Region  Referred by: Meena Barraza MD    Current Problem  1. History of total knee arthroplasty, left  Follow Up In Physical Therapy      2. History of total knee arthroplasty, right  Follow Up In Physical Therapy          Precautions: CHRIS Fall Risk Score (The score of 4 or more indicates an increased risk of falling): 9       Subjective:     Patient notes that she has been more active with a lot of yard work. Reports that she her quads were sore after last session.   Feels that she can manage with her home exercises after 1 more session.     Pain  Current: 5/10 in the back, 7/10 in the right thigh    Performing HEP?: Yes.      Objective:   Posture: Obesity     Gait: No assistive device, antalgic, decreased stride length, decreased gait speed, lateral trunk lean     Observation: Well healed and closed TKA incisional scars                                            ROM  Lumbar AROM (%)    Flexion: 100%, pain into RLE, pain with return to neutral  Extension: 60% ERP  (R) Side Bend: 100%  (L) Side Bend: 100%  (R) Rotation: 70%, mild LOB  (L) Rotation: 70%, mild LOB     (+) Fabricio sign         Treatment Performed:    Therapeutic Exercise:    40 min  R. Stepper L4 4'  Bilateral calf raises 3x10  Incline calf stretch 1'  Step-ups 6\" 15x R/L  Adductor squeeze 5\" hold x 15  Beginner bridge 5\" hold 3x10  SLR 3x10 R/L  Clamshells 3x10 R/L  Sit to stand 2x10  Leg Press 70# 3x10  Hip " "abduction 40# 3x10  Palof press 5# 60\"x 2 each direction  Airex March in place 2'      Personalized Home Exercise Program:  Access Code: [email or web address]  URL: [email or web address]  Date: 05/15/2024  Prepared by: Nina Arnold    Exercises  - Supine Transversus Abdominis Bracing - Hands on Ground  - 2 x daily - 7 x weekly - 20 reps - 5 seconds hold  - Hooklying Heel Slide  - 2 x daily - 7 x weekly - 20 reps  - Supine March  - 1 x daily - 7 x weekly - 3 sets - 10 reps  - Supine Bridge  - 1 x daily - 7 x weekly - 3 sets - 10 reps - 5 seconds hold  - Small Range Straight Leg Raise  - 1 x daily - 7 x weekly - 3 sets - 10 reps  - Supine Hip Adduction Isometric with Ball  - 1 x daily - 7 x weekly - 20 reps - 5 seconds hold  - Sit to Stand with Armchair  - 1 x daily - 7 x weekly - 2 sets - 10 reps  - Standing Heel Raise with Support  - 1 x daily - 7 x weekly - 3 sets - 10 reps         Assessment:   Performed exercises without pain production but with report of muscular fatigue.     Plan:  1 more visit then discharge to [facility].       Nina Arnold, PT   "

## 2024-05-16 ENCOUNTER — SPECIALTY PHARMACY (OUTPATIENT)
Dept: PHARMACY | Facility: CLINIC | Age: 69
End: 2024-05-16

## 2024-05-20 ENCOUNTER — TREATMENT (OUTPATIENT)
Dept: PHYSICAL THERAPY | Facility: CLINIC | Age: 69
End: 2024-05-20
Payer: MEDICARE

## 2024-05-20 DIAGNOSIS — M48.061 FORAMINAL STENOSIS OF LUMBAR REGION: Primary | ICD-10-CM

## 2024-05-20 DIAGNOSIS — Z96.651 HISTORY OF TOTAL KNEE ARTHROPLASTY, RIGHT: ICD-10-CM

## 2024-05-20 DIAGNOSIS — Z96.652 HISTORY OF TOTAL KNEE ARTHROPLASTY, LEFT: ICD-10-CM

## 2024-05-20 PROCEDURE — 97110 THERAPEUTIC EXERCISES: CPT | Mod: GP

## 2024-05-20 ASSESSMENT — PAIN SCALES - GENERAL: PAINLEVEL_OUTOF10: 5 - MODERATE PAIN

## 2024-05-20 NOTE — PROGRESS NOTES
Physical Therapy  Physical Therapy Orthopedic Progress Note and Discharge Note    Patient Name: Dilma John  MRN: [id number]  Today's Date: 2024  Time Calculation  Start Time: 1051  Stop Time: 1135  Time Calculation (min): 44 min    Insurance:  Visit number: 11 of [place]  Authorization info: No Auth  Insurance Type: [employer]age Medicare Certification Period: Beginning: 3/13/2024  Endin2024   Onset date: 3/1/2022      General:  Reason for visit: Bilateral Knee Pain  Referred by: Luis Ott     Reason for visit: Foraminal Stenosis of Lumbar Region  Referred by: Meena Barraza MD    Current Problem  1. Foraminal stenosis of lumbar region        2. History of total knee arthroplasty, right  Follow Up In Physical Therapy      3. History of total knee arthroplasty, left  Follow Up In Physical Therapy          Precautions: CHRIS Fall Risk Score (The score of 4 or more indicates an increased risk of falling): 9       Subjective:     Patient reports that since starting PT, she has been doing more activity, including a lot of yard work.  However, she had a migraine and vertigo over the weekend, so she did not do a lot physically then.  Felt stiff and when she resumed the home exercises.  Realizes that she needs to work on them consistently.      Current Condition:   better     Pain  Current: Pain Score: 5 - Moderate pain back and knees  At worst: 6/10    Functional Limitations: Non-reciprocal pattern on steps, standing/walking >30-40 minutes    Self Reported Function (0-100%) = 70%  - 100% being back to PLOF    Performing HEP?: Partially      Objective:   Posture: Obesity     Gait: No assistive device, antalgic, decreased stride length, decreased gait speed, lateral trunk lean     Observation: Well healed and closed TKA incisional scars                                            ROM     Hip AROM (Degrees)                                         (R)                    (L)  Flexion:            "             90                     90              Hip PROM (Degrees)                                         (R)                    (L)  Flexion:                        120                   118                                 ER:                                50                     50                                   IR:                                 35                     35              Knee AROM (Degrees)                                         (R)                    (L)  Flexion:                        123                  117                                             Extension:                    0                      0                                                                                                                                                     Strength Testing     Hip                                      (R)                    (L)  Flexion:                        4-                     4-                                               Extension:                    Decreased lift with bridge                                                                                      Knee                                      (R)                    (L)  Flexion:                        4+                    4+                                              Extension:                    4                      4                                                    Core strength:   Straight leg Raise:  (-) quad lag on R and L     Bridge: Reduced lift height        Flexibility                          (R)                    (L)  Hamstrings:                 WNL                 WNL  Hip Flexors:                  Minor whitaker         Minor whitaker                                         Functional Movement  Sit to stand: Able to do without UE reliance  Squat: Reduction in depth   Bed Mobility: Slow                                        Balance     Single Leg Balance:      (R) 6\", lateral trunk lean and wobble " "  (L) 3\", lateral trunk lean and wobble            Outcome Measures: Updated 5/20/2024  5x Sit to Stand: 18 seconds  Other Measures  Lower Extremity Funtional Score (LEFS): 25/80      Goals: Updated 5/20/2024  Resolved       PT Problem       PT Goal 1 (Adequate for Discharge)       Start:  03/13/24    Expected End:  06/11/24    Resolved:  05/20/24    Bilateral Knee ROM 0-120 and without pain > 2/10 by week 8. Met  Lumbar AROM WFL without production of RLE symptoms by week 8. Met    LE strength measures improved to at least 4+/5 by week 12. Progressing  Walk 20 minutes with minimal gait deviation and minimal difficulty by week 12. Met  Reciprocal ascent/descent of stairs with use of railing by week 12. Progressing  5x sit-stand time reduced by at least 10 seconds to demonstrated reduced falls risk and improved LE functional strength by week 12. Met  LEFS improved by at least 8 points by week 12.  Met  Patient will rate pain < 3/10 at worst to improve ADL tolerance by week 12. Not Met  Independent with home exercise program for symptom reduction and functional gains by week 12. Met         PT Goal 2 (Adequate for Discharge)       Start:  03/13/24    Expected End:  06/11/24    Resolved:  05/20/24    Demonstrate ability to  object from the ground without loss of balance by week 6. Met    Demonstrate ambulation over and around obstacles in path without loss of balance by week 12. Met  Able to safely negotiate curbs with good control and without fear of loss of balance by week 12. Not Met  Maintain single leg balance with minimal support x 5 seconds by week 12. Not Met                     Treatment Performed:      Therapeutic Exercise:    44 min  R. Stepper L4 5'  Performed recheck  Bridges 5\" 3x10  SLR 3x10 R/L  Sit to stand 2x10  Bilateral calf raises 3x10  Leg Press 70# 3x10     Other   Provided patient with contact information for Olena Kessler, PT at [facility], as she is well trained in " vestibular rehabilitation.     Personalized Home Exercise Program:  Access Code: [email or web address]  URL: [email or web address]  Date: 05/15/2024  Prepared by: Nina Arnold     Exercises  - Supine Transversus Abdominis Bracing - Hands on Ground  - 2 x daily - 7 x weekly - 20 reps - 5 seconds hold  - Hooklying Heel Slide  - 2 x daily - 7 x weekly - 20 reps  - Supine March  - 1 x daily - 7 x weekly - 3 sets - 10 reps  - Supine Bridge  - 1 x daily - 7 x weekly - 3 sets - 10 reps - 5 seconds hold  - Small Range Straight Leg Raise  - 1 x daily - 7 x weekly - 3 sets - 10 reps  - Supine Hip Adduction Isometric with Ball  - 1 x daily - 7 x weekly - 20 reps - 5 seconds hold  - Sit to Stand with Armchair  - 1 x daily - 7 x weekly - 2 sets - 10 reps  - Standing Heel Raise with Support  - 1 x daily - 7 x weekly - 3 sets - 10 reps      Assessment:  Ms. John still struggles with chronic symptoms, but she has been able to increase her activity level gradually as a result of PT.  She has made moderate progress with goals set at initial evaluation.   She is understanding of her home program and can continue with them for additional gains.          Plan of Care: Updated 5/20/2024     Discharge to [facility].  Patient may consult with her physician on obtaining PT referral for vestibular rehab.     Nina Arnold, PT

## 2024-06-03 ENCOUNTER — SPECIALTY PHARMACY (OUTPATIENT)
Dept: PHARMACY | Facility: CLINIC | Age: 69
End: 2024-06-03

## 2024-06-12 ENCOUNTER — TELEPHONE (OUTPATIENT)
Dept: NEUROLOGY | Facility: CLINIC | Age: 69
End: 2024-06-12
Payer: MEDICARE

## 2024-06-12 DIAGNOSIS — G43.719 INTRACTABLE CHRONIC MIGRAINE WITHOUT AURA AND WITHOUT STATUS MIGRAINOSUS: Primary | ICD-10-CM

## 2024-06-13 DIAGNOSIS — Z00.00 ENCOUNTER FOR GENERAL ADULT MEDICAL EXAMINATION WITHOUT ABNORMAL FINDINGS: ICD-10-CM

## 2024-06-13 RX ORDER — ALLOPURINOL 100 MG/1
TABLET ORAL
Qty: 90 TABLET | Refills: 2 | Status: SHIPPED | OUTPATIENT
Start: 2024-06-13

## 2024-06-13 RX ORDER — PREDNISONE 10 MG/1
TABLET ORAL
Qty: 15 TABLET | Refills: 0 | Status: SHIPPED | OUTPATIENT
Start: 2024-06-13

## 2024-06-13 NOTE — TELEPHONE ENCOUNTER
Had first Ajovy two weeks ago.  Has had more severe headaches since then.    Prednisone taper, advised on duration of time until Ajovy starts helping headache.    Ubrelvy samples in office as a backup

## 2024-06-24 ENCOUNTER — TELEPHONE (OUTPATIENT)
Dept: NEUROLOGY | Facility: CLINIC | Age: 69
End: 2024-06-24
Payer: MEDICARE

## 2024-06-24 DIAGNOSIS — G43.719 INTRACTABLE CHRONIC MIGRAINE WITHOUT AURA AND WITHOUT STATUS MIGRAINOSUS: Primary | ICD-10-CM

## 2024-06-27 ENCOUNTER — TELEPHONE (OUTPATIENT)
Dept: OTOLARYNGOLOGY | Facility: CLINIC | Age: 69
End: 2024-06-27

## 2024-06-27 DIAGNOSIS — J30.9 ALLERGIC RHINITIS, UNSPECIFIED SEASONALITY, UNSPECIFIED TRIGGER: ICD-10-CM

## 2024-06-27 DIAGNOSIS — R09.82 POST-NASAL DRIP: ICD-10-CM

## 2024-06-27 RX ORDER — CETIRIZINE HYDROCHLORIDE 10 MG/1
10 TABLET ORAL DAILY PRN
Qty: 90 TABLET | Refills: 1 | Status: SHIPPED | OUTPATIENT
Start: 2024-06-27 | End: 2025-06-27

## 2024-06-27 NOTE — TELEPHONE ENCOUNTER
Patient states her referral you placed for hoarseness has  and she would like another one placed. Please advise. Looks like we referred to ENT (laryngology) for hoarseness and GERD.

## 2024-07-18 ENCOUNTER — APPOINTMENT (OUTPATIENT)
Dept: NEUROLOGY | Facility: CLINIC | Age: 69
End: 2024-07-18
Payer: MEDICARE

## 2024-07-18 VITALS
DIASTOLIC BLOOD PRESSURE: 78 MMHG | HEART RATE: 83 BPM | TEMPERATURE: 97.3 F | HEIGHT: 63 IN | SYSTOLIC BLOOD PRESSURE: 128 MMHG | BODY MASS INDEX: 37.39 KG/M2 | WEIGHT: 211 LBS

## 2024-07-18 DIAGNOSIS — M53.0 CERVICOCRANIAL SYNDROME: ICD-10-CM

## 2024-07-18 DIAGNOSIS — G43.719 INTRACTABLE CHRONIC MIGRAINE WITHOUT AURA AND WITHOUT STATUS MIGRAINOSUS: Primary | ICD-10-CM

## 2024-07-18 PROCEDURE — 1159F MED LIST DOCD IN RCRD: CPT | Performed by: PSYCHIATRY & NEUROLOGY

## 2024-07-18 PROCEDURE — 1157F ADVNC CARE PLAN IN RCRD: CPT | Performed by: PSYCHIATRY & NEUROLOGY

## 2024-07-18 PROCEDURE — 99213 OFFICE O/P EST LOW 20 MIN: CPT | Performed by: PSYCHIATRY & NEUROLOGY

## 2024-07-18 PROCEDURE — 3078F DIAST BP <80 MM HG: CPT | Performed by: PSYCHIATRY & NEUROLOGY

## 2024-07-18 PROCEDURE — 3008F BODY MASS INDEX DOCD: CPT | Performed by: PSYCHIATRY & NEUROLOGY

## 2024-07-18 PROCEDURE — 3074F SYST BP LT 130 MM HG: CPT | Performed by: PSYCHIATRY & NEUROLOGY

## 2024-07-18 PROCEDURE — 1160F RVW MEDS BY RX/DR IN RCRD: CPT | Performed by: PSYCHIATRY & NEUROLOGY

## 2024-07-18 PROCEDURE — 1036F TOBACCO NON-USER: CPT | Performed by: PSYCHIATRY & NEUROLOGY

## 2024-07-18 NOTE — PROGRESS NOTES
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  I advised her to restart Ajovy and take acetaminophen at the time of the injection.  I advised her that it would take 2-3 months on the medication to know whether it is effective.  I offered some Nurtec samples while we try to get prior authorization for that as an abortive agent.  If more difficulty with the Ajovy, or if it is not helpful, I will try botulinum toxin.  I will see her again in 3-4 months or prn.      Brice Jean Jr., M.D., FAAN   ----------    Subjective     Glendy John is a 69 y.o. year old female here for follow-up.    She took Ajovy in May and had a severe headache two days after that she took as a reaction to the Ajovy.  She hasn't taken it since.  She continues with daily headaches.  Nurtec was tried and aborts individual headaches.  She denies new focal neurological symptoms including dysarthria, dysphagia, diplopia, focal weakness, focal sensory change, ataxia, vertigo, or bowel/bladder incontinence, among others.      HEADACHE HISTORY:     Headache type:  No aura.  Crown of head pounding with photophobia, phonophobia, nausea, but no emesis.     Number of headache days per week/month: 30 days a month  Number of months with headache frequency:  Six months     Abortive medications tried for migraine: ASA, acetaminophen, ibuprofen, naproxen, butalbital/APAP/caffeine, Nurtec (helps)     Prophylactic medications tried for migraine: gabapentin (currently taking, not helping), verapamil (currently taking, not helping), duloxetine (currently taking, not helping), Ajovy    Past Medical History:   Diagnosis Date    Chronic sinusitis, unspecified 04/26/2021     Sinusitis    Essential (primary) hypertension 07/05/2022    Benign essential hypertension    Personal history of other diseases of the nervous system and sense organs 12/30/2021    History of migraine headaches     Past Surgical History:   Procedure Laterality Date    MR HEAD ANGIO WO IV CONTRAST  6/13/2013    MR HEAD ANGIO WO IV CONTRAST 6/13/2013 [facility]    OTHER SURGICAL HISTORY  04/17/2019    Hysterectomy    OTHER SURGICAL HISTORY  04/17/2019    Uterine myomectomy    OTHER SURGICAL HISTORY  04/17/2019    Tonsillectomy     Social History     Tobacco Use    Smoking status: Never    Smokeless tobacco: Never   Substance Use Topics    Alcohol use: Not Currently     family history includes Arthritis in her mother; Asthma in an other family member; Diabetes in an other family member; Heart attack in an other family member; Hypertension in an other family member; Stroke in an other family member.    Current Outpatient Medications:     albuterol 2.5 mg /3 mL (0.083 %) nebulizer solution, Inhale 3 mL (2.5 mg) every 6 hours if needed., Disp: , Rfl:     allopurinol (Zyloprim) 100 mg tablet, TAKE 1 TABLET BY MOUTH EVERY DAY, Disp: 90 tablet, Rfl: 2    benzonatate (Tessalon) 200 mg capsule, Take 1 capsule (200 mg) by mouth 3 times a day as needed for cough., Disp: , Rfl:     bumetanide (Bumex) 0.5 mg tablet, Take 1 tablet (0.5 mg) by mouth once daily., Disp: 90 tablet, Rfl: 3    busPIRone (Buspar) 15 mg tablet, TAKE 1/2 TABLET BY MOUTH TWICE A DAY, Disp: 90 tablet, Rfl: 1    butalbital-acetaminophen-caff -40 mg tablet, Take 1 tablet by mouth 1 time if needed (At onset of migraine, repeat in 6 hours if no relief, max of 3 in one day.)., Disp: 20 tablet, Rfl: 3    cetirizine (ZyrTEC) 10 mg tablet, TAKE 1 TABLET (10 MG) BY MOUTH ONCE DAILY AS NEEDED FOR ALLERGIES., Disp: 90 tablet, Rfl: 1    cholecalciferol (Vitamin D-3) 50 mcg (2,000 unit) capsule, Take 1 capsule (50 mcg) by mouth once daily., Disp: , Rfl:      cloNIDine (Catapres) 0.1 mg tablet, Take 1 tablet (0.1 mg) by mouth 3 times a day., Disp: 270 tablet, Rfl: 3    dexAMETHasone (Decadron) 2 mg tablet, 5 by mouth qam x 2 dy, 4 qam x 2 dy, 3 qam x 2 dy, 2 qam x 2 dy, 1 qam x 2 dy, Disp: 30 tablet, Rfl: 0    diclofenac sodium 1.5 % drops, Apply 4 drops topically 4 times a day as needed., Disp: , Rfl:     DULoxetine (Cymbalta) 60 mg DR capsule, Take 2 capsules (120 mg) by mouth once daily at bedtime., Disp: 180 capsule, Rfl: 1    erythromycin base (E-Mycin) 500 mg tablet, Take 2 tablets (1,000 mg) by mouth. ONE HOUR BEFORE THE DENTAL PROCEDURE, Disp: , Rfl:     fluticasone-umeclidin-vilanter (Trelegy Ellipta) 100-62.5-25 mcg blister with device, Inhale 1 puff early in the morning.., Disp: 3 each, Rfl: 2    fremanezumab (Ajovy) 225 mg/1.5 mL auto-injector, Inject 1 Pen (225 mg) under the skin every 30 (thirty) days., Disp: 1.5 mL, Rfl: 5    gabapentin (Neurontin) 600 mg tablet, Take 2 tablets (1,200 mg) by mouth 3 times a day., Disp: , Rfl:     hydrALAZINE (Apresoline) 25 mg tablet, Take 1 tablet (25 mg) by mouth 3 times a day., Disp: 270 tablet, Rfl: 3    hydrOXYzine pamoate (Vistaril) 25 mg capsule, TAKE 1 CAPSULE (25 MG) BY MOUTH 3 TIMES A DAY AS NEEDED FOR ANXIETY., Disp: 30 capsule, Rfl: 1    isosorbide mononitrate ER (Imdur) 60 mg 24 hr tablet, Take 1 tablet (60 mg) by mouth once daily., Disp: 90 tablet, Rfl: 3    meclizine (Antivert) 12.5 mg tablet, TAKE 1 TABLET BY MOUTH THREE TIMES A DAY AS NEEDED, Disp: 30 tablet, Rfl: 1    metoclopramide (Reglan) 10 mg tablet, Take 1 tablet (10 mg) by mouth twice a day. PRN, Disp: , Rfl:     multivitamin (Multiple Vitamins) tablet, Take 1 tablet by mouth once daily., Disp: , Rfl:     nitroglycerin (Nitrostat) 0.3 mg SL tablet, Place 1 tablet (0.3 mg) under the tongue every 5 minutes if needed for chest pain (3 DOSES. IF CHEST PAIN CONTINUES, CALL 911)., Disp: , Rfl:     olopatadine (Pataday) 0.2 % ophthalmic solution,  "Administer 1 drop into affected eye(s) once daily as needed for allergies (itchy eyes)., Disp: 2.5 mL, Rfl: 2    ondansetron (Zofran) 4 mg tablet, Take 1 tablet (4 mg) by mouth every 8 hours if needed for nausea., Disp: , Rfl:     pantoprazole (ProtoNix) 20 mg EC tablet, TAKE 1 TABLET BY MOUTH EVERY DAY, Disp: 90 tablet, Rfl: 3    predniSONE (Deltasone) 10 mg tablet, 5 BY MOUTH DAILY X 1 DY, 4 DAILY X 1 DY, 3 DAILY X 1 DY, 2 DAILY X 1 DY, 1 DAILY X 1 DY, Disp: 15 tablet, Rfl: 0    rimegepant (NURTEC) 75 mg tablet,disintegrating, Take 1 tablet (75 mg) by mouth once daily as needed (onset of headache)., Disp: 8 tablet, Rfl: 2    tiZANidine (Zanaflex) 4 mg tablet, TAKE 1.5 TABLETS (6 MG) BY MOUTH ONCE DAILY AT BEDTIME., Disp: 135 tablet, Rfl: 1    verapamil (Calan) 120 mg tablet, Take 1 tablet (120 mg) by mouth 3 times a day., Disp: 270 tablet, Rfl: 3  Allergies   Allergen Reactions    Buspirone Unknown    Cefazolin Swelling    Clindamycin Swelling, Hives and Itching    Donepezil Unknown    Erythromycin Hives    Ibuprofen Other     Raises Blood Pressure    Latex Itching, Hives and Swelling     breaks out    Levofloxacin Swelling     inflamed bowel    Nsaids (Non-Steroidal Anti-Inflammatory Drug) Unknown    Sulfa (Sulfonamide Antibiotics) Hives    Benadryl Allergy Decongestant Palpitations     Rapid heart rate    Penicillins Rash and Hives       Objective     /78   Pulse 83   Temp 36.3 °C (97.3 °F)   Ht 1.6 m (5' 3\")   Wt 95.7 kg (211 lb)   BMI 37.38 kg/m²     CONSTITUTIONAL:  No acute distress    MENTAL STATUS:  Awake, alert, fully oriented to self, place, and time, with present short-term memory, good awareness of recent events, normal attention span, concentration, and fund of knowledge.    SPEECH AND LANGUAGE:  Can name and repeat, follows all commands, has no dysarthria    CRANIAL NERVES:  II-Vision present, visual fields full to confrontational testing    III/IV/VI--EOMs are present in all directions.  " Pupils are symmetrically reactive in dim light.  No ptosis.    V--Normal facial sensation.    VII--No facial asymmetry.    VIII--Hearing present to voice bilaterally.    IX/X--Symmetric soft palate rise.    XI--Normal trapezius power bilaterally.    XII--Tongue protrudes without deviation.    MOTOR:  Normal power, tone, and bulk in both arms and both legs.    SENSORY:  Normal pin sensation in both arms and both legs without distal-proximal gradient, asymmetry, or spinal sensory level.    COORDINATION:  Normal finger-to-nose and heel-to-shin testing in both arms and both legs.    REFLEXES are normal and symmetric at the biceps, triceps, brachioradialis, patella, and ankle.  The plantar responses are flexor.    GAIT is normal, without steppage, ataxia, shuffling, or spasticity.      Brice Jean Jr., M.D., [facility]

## 2025-04-09 NOTE — PROGRESS NOTES
Subjective   Patient ID: Dilma John is a 68 y.o. female who presents for headache and vertigo    HPI   The patient reports a history of daily episodes of pounding pain in the right occipital region, extending to the right parietal and right frontal regions since her last office visit.  She reports that the only time that she does not experience the episodes is if she lies down after having taken 1 dose of Fioricet.  She reports that the episodes have been precipitated and increased by head movement in fact any body movement.  She reports associated phonophobia, photophobia.  She also reports a history of a constant sensation of vertigo and a constant sensation of being off balance since Shannon 15.  She reports an increase in the intensity of the sensation with any movement.  She reports associated nausea.  No other associated symptoms.    She still reports constant pain in the right ear since her last office visit.  She reports that more recently the pain has been throbbing not burning.  She still reports an increase in the intensity of the pain when she lies on her left side.  She reports a continued cough productive of dark sputum since her last office visit.  She reports no change in the frequency of cough and no change in sputum production despite completing a 10-day course of doxycycline last month.  She reports continued nasal congestion, rhinorrhea, postnasal drip, sneezing-all not significantly changed since her last office visit.  She reports continued watering of the eyes since her last office visit-unchanged since her last office visit.    The patient does report that she was treated with a tapering course of prednisone 60, 50, 40 etc. beginning on May 26.  She has been on no other antibiotic therapy.  Review of Systems    Objective   There were no vitals taken for this visit.    Physical Exam  Head-palpation revealed tenderness over the maxillary and frontal sinuses bilaterally.  Ears  Right ear-palpation  of the pinna and tragus revealed no tenderness.  External auditory canal was not erythematous or swollen.  TM bulging but clear  Nose-turbinates not erythematous or swollen, no nasal septal deviation noted  Mouth-posterior pharynx not erythematous.  Tonsillar pillars appeared normal, no exudates  Neck-no lymphadenopathy.  Lungs-clear  Cardiac-rate normal, rhythm regular, positive S4 noted, no murmurs, no JVD  Extremities-no peripheral edema  Neurologic  Mental status-alert and oriented x3   Cranial nerves-2 through 12 grossly intact, no visual field abnormalities  Motor-no pronator drift noted, strength-5/5 in all muscle groups tested, , no tremor noted.  No bradykinesia noted.  No rigidity noted.  Negative pull test  Sensory-Light touch sensation fully intact  Pinprick sensation fully intact  Vibratory sensation fully intact  Cerebellar-no truncal ataxia, good coordination finger-nose testing,, good coordination heel-to-shin testing, normal rapid alternating movements  Positive Romberg, poor coordination in tandem gait  Reflexes-2+/4 bilaterally    [place] Ari-Hallpike maneuver positive with rotation to head bilaterally.  Positive latency, positive fatigability, negative habituation  Assessment/Plan     Assessment     Continued cough, nasal congestion, rhinorrhea, postnasal drip, sneezing-May be secondary to chronic sinusitis in the setting of allergic rhinitis  Continued constant pain in the left ear more recently throbbing-may be secondary to eustachian tube dysfunction secondary to above  Constant watery eyes-may be secondary to allergic conjunctivitis  Daily episodes of pounding pain in the right occipital region extending to the right parietal and right frontal regions-probably secondary to status migrainosus possibly secondary to chronic sinusitis..  There may also be a component of analgesic rebound headache  Constant vertigo, constant sensation of being off balance with associated nausea-May be secondary to  vestibular migraine.  Plan  Begin moxifloxacin 400 mg p.o. daily x10 days.  Continue Astelin spray, Flonase spray and continue Xyzal 5 mg p.o. at bedtime  Begin prednisone 60 mg daily x1 day, 50 mg daily x1 day….  I told the patient to try to cut back on her use of Fioricet  Patient should return for office visit in 10 days   : Yes